# Patient Record
Sex: MALE | Race: WHITE | Employment: OTHER | URBAN - METROPOLITAN AREA
[De-identification: names, ages, dates, MRNs, and addresses within clinical notes are randomized per-mention and may not be internally consistent; named-entity substitution may affect disease eponyms.]

---

## 2018-03-28 ENCOUNTER — HOSPITAL ENCOUNTER (OUTPATIENT)
Dept: ULTRASOUND IMAGING | Age: 73
Discharge: HOME | End: 2018-03-28
Attending: NURSE PRACTITIONER | Admitting: NURSE PRACTITIONER

## 2018-03-28 DIAGNOSIS — Z13.6 ENCOUNTER FOR ABDOMINAL AORTIC ANEURYSM SCREENING: ICD-10-CM

## 2018-03-28 DIAGNOSIS — Z13.6 ENCOUNTER FOR SCREENING FOR CARDIOVASCULAR DISORDERS: ICD-10-CM

## 2018-04-03 ENCOUNTER — HOSPITAL ENCOUNTER (OUTPATIENT)
Dept: OTHER | Age: 73
Discharge: HOME | End: 2018-04-03
Attending: INTERNAL MEDICINE | Admitting: INTERNAL MEDICINE

## 2018-04-04 LAB
HAV IGM SER IA-ACNC: NORMAL
HEPATITIS B CORE IGM ANTIBODY: NORMAL
HEPATITIS B SURFACE ANTIGEN INTERPRETATION: NORMAL
HEPATITIS C ANTIBODY INTERPRETATION: NORMAL

## 2018-04-06 LAB — AFP: 4.9 UG/L

## 2018-05-17 ENCOUNTER — HOSPITAL ENCOUNTER (OUTPATIENT)
Dept: CT IMAGING | Facility: MEDICAL CENTER | Age: 73
Discharge: HOME | End: 2018-05-17
Attending: INTERNAL MEDICINE | Admitting: INTERNAL MEDICINE

## 2018-05-17 DIAGNOSIS — C18.7 MALIGNANT NEOPLASM OF SIGMOID COLON (HCC): ICD-10-CM

## 2018-05-17 DIAGNOSIS — R52 PAIN: ICD-10-CM

## 2018-05-17 LAB
BUN BLDV-MCNC: 7 MG/DL (ref 7–20)
CREAT SERPL-MCNC: 0.9 MG/DL (ref 0.8–1.3)
GFR AFRICAN AMERICAN: >60
GFR NON-AFRICAN AMERICAN: >60

## 2018-05-29 ENCOUNTER — INITIAL CONSULT (OUTPATIENT)
Dept: SURGERY | Age: 73
End: 2018-05-29

## 2018-05-29 VITALS
HEIGHT: 69 IN | BODY MASS INDEX: 29.47 KG/M2 | SYSTOLIC BLOOD PRESSURE: 134 MMHG | DIASTOLIC BLOOD PRESSURE: 82 MMHG | WEIGHT: 199 LBS

## 2018-05-29 DIAGNOSIS — R16.0 LIVER MASSES: ICD-10-CM

## 2018-05-29 DIAGNOSIS — K57.30 DIVERTICULOSIS OF LARGE INTESTINE WITHOUT HEMORRHAGE: ICD-10-CM

## 2018-05-29 DIAGNOSIS — K63.89 COLONIC MASS: Primary | ICD-10-CM

## 2018-05-29 PROCEDURE — 4004F PT TOBACCO SCREEN RCVD TLK: CPT | Performed by: SURGERY

## 2018-05-29 PROCEDURE — 99203 OFFICE O/P NEW LOW 30 MIN: CPT | Performed by: SURGERY

## 2018-05-29 PROCEDURE — 1123F ACP DISCUSS/DSCN MKR DOCD: CPT | Performed by: SURGERY

## 2018-05-29 PROCEDURE — 4040F PNEUMOC VAC/ADMIN/RCVD: CPT | Performed by: SURGERY

## 2018-05-29 PROCEDURE — 3017F COLORECTAL CA SCREEN DOC REV: CPT | Performed by: SURGERY

## 2018-05-29 PROCEDURE — G8427 DOCREV CUR MEDS BY ELIG CLIN: HCPCS | Performed by: SURGERY

## 2018-05-29 PROCEDURE — G8419 CALC BMI OUT NRM PARAM NOF/U: HCPCS | Performed by: SURGERY

## 2018-05-29 RX ORDER — AMLODIPINE BESYLATE 5 MG/1
5 TABLET ORAL DAILY
COMMUNITY
End: 2020-05-29 | Stop reason: SDUPTHER

## 2018-05-29 RX ORDER — GLIPIZIDE 5 MG/1
5 TABLET ORAL DAILY
COMMUNITY
End: 2020-05-29 | Stop reason: SDUPTHER

## 2018-05-29 RX ORDER — NEBIVOLOL 10 MG/1
10 TABLET ORAL DAILY
COMMUNITY
End: 2020-05-29 | Stop reason: SDUPTHER

## 2018-06-04 ENCOUNTER — HOSPITAL ENCOUNTER (OUTPATIENT)
Dept: MRI IMAGING | Age: 73
Discharge: HOME | End: 2018-06-04
Attending: SURGERY | Admitting: SURGERY

## 2018-06-04 DIAGNOSIS — R16.0 LIVER MASSES: ICD-10-CM

## 2018-06-04 DIAGNOSIS — K63.89 COLONIC MASS: ICD-10-CM

## 2018-06-04 DIAGNOSIS — K63.9 DISEASE OF INTESTINE: ICD-10-CM

## 2018-06-05 ENCOUNTER — TELEPHONE (OUTPATIENT)
Dept: SURGERY | Age: 73
End: 2018-06-05

## 2018-06-06 ENCOUNTER — TELEPHONE (OUTPATIENT)
Dept: SURGERY | Age: 73
End: 2018-06-06

## 2018-06-07 ENCOUNTER — SURG/PROC ORDERS (OUTPATIENT)
Dept: SURGERY | Age: 73
End: 2018-06-07

## 2018-06-07 RX ORDER — HEPARIN SODIUM 5000 [USP'U]/ML
5000 INJECTION, SOLUTION INTRAVENOUS; SUBCUTANEOUS EVERY 8 HOURS SCHEDULED
Status: CANCELLED | OUTPATIENT
Start: 2018-06-07

## 2018-06-07 RX ORDER — SODIUM CHLORIDE 0.9 % (FLUSH) 0.9 %
10 SYRINGE (ML) INJECTION EVERY 12 HOURS SCHEDULED
Status: CANCELLED | OUTPATIENT
Start: 2018-06-07

## 2018-06-07 RX ORDER — SODIUM CHLORIDE 0.9 % (FLUSH) 0.9 %
10 SYRINGE (ML) INJECTION PRN
Status: CANCELLED | OUTPATIENT
Start: 2018-06-07

## 2018-06-07 RX ORDER — ALVIMOPAN 12 MG/1
12 CAPSULE ORAL 2 TIMES DAILY
Status: CANCELLED | OUTPATIENT
Start: 2018-06-07 | End: 2018-06-12

## 2018-06-14 PROBLEM — D12.5 ADENOMATOUS POLYP OF SIGMOID COLON: Status: ACTIVE | Noted: 2018-06-14

## 2018-06-18 PROBLEM — D12.5 ADENOMATOUS POLYP OF SIGMOID COLON: Status: RESOLVED | Noted: 2018-06-14 | Resolved: 2018-06-18

## 2018-06-21 ENCOUNTER — TELEPHONE (OUTPATIENT)
Dept: SURGERY | Age: 73
End: 2018-06-21

## 2018-06-25 ENCOUNTER — TELEPHONE (OUTPATIENT)
Dept: SURGERY | Age: 73
End: 2018-06-25

## 2018-06-26 ENCOUNTER — OFFICE VISIT (OUTPATIENT)
Dept: SURGERY | Age: 73
End: 2018-06-26

## 2018-06-26 VITALS
HEIGHT: 69 IN | WEIGHT: 188 LBS | SYSTOLIC BLOOD PRESSURE: 124 MMHG | BODY MASS INDEX: 27.85 KG/M2 | DIASTOLIC BLOOD PRESSURE: 70 MMHG

## 2018-06-26 DIAGNOSIS — Z98.890 POST-OPERATIVE STATE: Primary | ICD-10-CM

## 2018-06-26 PROCEDURE — 99024 POSTOP FOLLOW-UP VISIT: CPT | Performed by: SURGERY

## 2018-07-10 ENCOUNTER — OFFICE VISIT (OUTPATIENT)
Dept: SURGERY | Age: 73
End: 2018-07-10

## 2018-07-10 VITALS
DIASTOLIC BLOOD PRESSURE: 70 MMHG | HEIGHT: 69 IN | SYSTOLIC BLOOD PRESSURE: 138 MMHG | WEIGHT: 186 LBS | BODY MASS INDEX: 27.55 KG/M2

## 2018-07-10 DIAGNOSIS — Z98.890 POST-OPERATIVE STATE: Primary | ICD-10-CM

## 2018-07-10 PROCEDURE — 99024 POSTOP FOLLOW-UP VISIT: CPT | Performed by: SURGERY

## 2018-07-24 ENCOUNTER — OFFICE VISIT (OUTPATIENT)
Dept: SURGERY | Age: 73
End: 2018-07-24

## 2018-07-24 VITALS
WEIGHT: 186 LBS | SYSTOLIC BLOOD PRESSURE: 134 MMHG | HEIGHT: 69 IN | BODY MASS INDEX: 27.55 KG/M2 | DIASTOLIC BLOOD PRESSURE: 82 MMHG

## 2018-07-24 DIAGNOSIS — Z98.890 POST-OPERATIVE STATE: Primary | ICD-10-CM

## 2018-07-24 PROCEDURE — 99024 POSTOP FOLLOW-UP VISIT: CPT | Performed by: SURGERY

## 2019-09-23 ENCOUNTER — TELEPHONE (OUTPATIENT)
Dept: FAMILY MEDICINE CLINIC | Age: 74
End: 2019-09-23

## 2019-09-23 ENCOUNTER — OFFICE VISIT (OUTPATIENT)
Dept: FAMILY MEDICINE CLINIC | Age: 74
End: 2019-09-23
Payer: MEDICARE

## 2019-09-23 VITALS
HEART RATE: 73 BPM | DIASTOLIC BLOOD PRESSURE: 72 MMHG | BODY MASS INDEX: 31.41 KG/M2 | SYSTOLIC BLOOD PRESSURE: 160 MMHG | TEMPERATURE: 98.1 F | OXYGEN SATURATION: 97 % | WEIGHT: 207.25 LBS | HEIGHT: 68 IN

## 2019-09-23 DIAGNOSIS — E11.9 TYPE 2 DIABETES MELLITUS WITHOUT COMPLICATION, WITHOUT LONG-TERM CURRENT USE OF INSULIN (HCC): Primary | ICD-10-CM

## 2019-09-23 DIAGNOSIS — I63.9 CEREBROVASCULAR ACCIDENT (CVA), UNSPECIFIED MECHANISM (HCC): ICD-10-CM

## 2019-09-23 DIAGNOSIS — E78.2 MIXED HYPERLIPIDEMIA: ICD-10-CM

## 2019-09-23 DIAGNOSIS — K21.9 GASTROESOPHAGEAL REFLUX DISEASE WITHOUT ESOPHAGITIS: ICD-10-CM

## 2019-09-23 DIAGNOSIS — I10 ESSENTIAL HYPERTENSION: ICD-10-CM

## 2019-09-23 LAB
A/G RATIO: 1.5 (ref 1.1–2.2)
ALBUMIN SERPL-MCNC: 4.7 G/DL (ref 3.4–5)
ALP BLD-CCNC: 51 U/L (ref 40–129)
ALT SERPL-CCNC: 28 U/L (ref 10–40)
ANION GAP SERPL CALCULATED.3IONS-SCNC: 15 MMOL/L (ref 3–16)
AST SERPL-CCNC: 34 U/L (ref 15–37)
BASOPHILS ABSOLUTE: 0 K/UL (ref 0–0.2)
BASOPHILS RELATIVE PERCENT: 1 %
BILIRUB SERPL-MCNC: 0.5 MG/DL (ref 0–1)
BUN BLDV-MCNC: 12 MG/DL (ref 7–20)
CALCIUM SERPL-MCNC: 10 MG/DL (ref 8.3–10.6)
CHLORIDE BLD-SCNC: 98 MMOL/L (ref 99–110)
CHOLESTEROL, TOTAL: 144 MG/DL (ref 0–199)
CO2: 26 MMOL/L (ref 21–32)
CREAT SERPL-MCNC: 1.1 MG/DL (ref 0.8–1.3)
EOSINOPHILS ABSOLUTE: 0.1 K/UL (ref 0–0.6)
EOSINOPHILS RELATIVE PERCENT: 1.9 %
GFR AFRICAN AMERICAN: >60
GFR NON-AFRICAN AMERICAN: >60
GLOBULIN: 3.1 G/DL
GLUCOSE BLD-MCNC: 149 MG/DL (ref 70–99)
HCT VFR BLD CALC: 38 % (ref 40.5–52.5)
HDLC SERPL-MCNC: 35 MG/DL (ref 40–60)
HEMOGLOBIN: 12 G/DL (ref 13.5–17.5)
LDL CHOLESTEROL CALCULATED: 78 MG/DL
LYMPHOCYTES ABSOLUTE: 1 K/UL (ref 1–5.1)
LYMPHOCYTES RELATIVE PERCENT: 21.9 %
MCH RBC QN AUTO: 25 PG (ref 26–34)
MCHC RBC AUTO-ENTMCNC: 31.5 G/DL (ref 31–36)
MCV RBC AUTO: 79.5 FL (ref 80–100)
MONOCYTES ABSOLUTE: 0.4 K/UL (ref 0–1.3)
MONOCYTES RELATIVE PERCENT: 9.1 %
NEUTROPHILS ABSOLUTE: 3 K/UL (ref 1.7–7.7)
NEUTROPHILS RELATIVE PERCENT: 66.1 %
PDW BLD-RTO: 16.6 % (ref 12.4–15.4)
PLATELET # BLD: 197 K/UL (ref 135–450)
PMV BLD AUTO: 9.1 FL (ref 5–10.5)
POTASSIUM SERPL-SCNC: 4.6 MMOL/L (ref 3.5–5.1)
RBC # BLD: 4.79 M/UL (ref 4.2–5.9)
SODIUM BLD-SCNC: 139 MMOL/L (ref 136–145)
TOTAL PROTEIN: 7.8 G/DL (ref 6.4–8.2)
TRIGL SERPL-MCNC: 157 MG/DL (ref 0–150)
VLDLC SERPL CALC-MCNC: 31 MG/DL
WBC # BLD: 4.6 K/UL (ref 4–11)

## 2019-09-23 PROCEDURE — 4004F PT TOBACCO SCREEN RCVD TLK: CPT | Performed by: NURSE PRACTITIONER

## 2019-09-23 PROCEDURE — 4040F PNEUMOC VAC/ADMIN/RCVD: CPT | Performed by: NURSE PRACTITIONER

## 2019-09-23 PROCEDURE — 2022F DILAT RTA XM EVC RTNOPTHY: CPT | Performed by: NURSE PRACTITIONER

## 2019-09-23 PROCEDURE — 36415 COLL VENOUS BLD VENIPUNCTURE: CPT | Performed by: NURSE PRACTITIONER

## 2019-09-23 PROCEDURE — 3017F COLORECTAL CA SCREEN DOC REV: CPT | Performed by: NURSE PRACTITIONER

## 2019-09-23 PROCEDURE — G8598 ASA/ANTIPLAT THER USED: HCPCS | Performed by: NURSE PRACTITIONER

## 2019-09-23 PROCEDURE — 1123F ACP DISCUSS/DSCN MKR DOCD: CPT | Performed by: NURSE PRACTITIONER

## 2019-09-23 PROCEDURE — G8417 CALC BMI ABV UP PARAM F/U: HCPCS | Performed by: NURSE PRACTITIONER

## 2019-09-23 PROCEDURE — 99203 OFFICE O/P NEW LOW 30 MIN: CPT | Performed by: NURSE PRACTITIONER

## 2019-09-23 PROCEDURE — G8427 DOCREV CUR MEDS BY ELIG CLIN: HCPCS | Performed by: NURSE PRACTITIONER

## 2019-09-23 PROCEDURE — 3046F HEMOGLOBIN A1C LEVEL >9.0%: CPT | Performed by: NURSE PRACTITIONER

## 2019-09-23 RX ORDER — ATORVASTATIN CALCIUM 10 MG/1
10 TABLET, FILM COATED ORAL DAILY
COMMUNITY
End: 2020-05-29 | Stop reason: SDUPTHER

## 2019-09-23 RX ORDER — ERGOCALCIFEROL 1.25 MG/1
CAPSULE ORAL
COMMUNITY
Start: 2018-04-06 | End: 2020-05-29 | Stop reason: SDUPTHER

## 2019-09-23 RX ORDER — LISINOPRIL 30 MG/1
30 TABLET ORAL DAILY
COMMUNITY
End: 2020-05-29 | Stop reason: SDUPTHER

## 2019-09-23 ASSESSMENT — ENCOUNTER SYMPTOMS
SORE THROAT: 0
COUGH: 0
RHINORRHEA: 0
CONSTIPATION: 1
BACK PAIN: 1
WHEEZING: 0
SHORTNESS OF BREATH: 0
TROUBLE SWALLOWING: 0
ABDOMINAL PAIN: 0
DIARRHEA: 0
BLOOD IN STOOL: 0
ABDOMINAL DISTENTION: 1
NAUSEA: 1

## 2019-09-23 ASSESSMENT — PATIENT HEALTH QUESTIONNAIRE - PHQ9
2. FEELING DOWN, DEPRESSED OR HOPELESS: 1
SUM OF ALL RESPONSES TO PHQ QUESTIONS 1-9: 2
SUM OF ALL RESPONSES TO PHQ QUESTIONS 1-9: 2
1. LITTLE INTEREST OR PLEASURE IN DOING THINGS: 1
SUM OF ALL RESPONSES TO PHQ9 QUESTIONS 1 & 2: 2

## 2019-09-23 NOTE — PATIENT INSTRUCTIONS
Please read the healthy family handout that you were given and share it with your family. Please compare this printed medication list with your medications at home to be sure they are the same. If you have any medications that are different please contact us immediately at [phone]. Also review your allergies that we have listed, these may also include medications that you have not been able to tolerate, make sure everything listed is correct. If you have any allergies that are different please contact us immediately at [phone]. Patient Education        Learning About Meal Planning for Diabetes  Why plan your meals? Meal planning can be a key part of managing diabetes. Planning meals and snacks with the right balance of carbohydrate, protein, and fat can help you keep your blood sugar at the target level you set with your doctor. You don't have to eat special foods. You can eat what your family eats, including sweets once in a while. But you do have to pay attention to how often you eat and how much you eat of certain foods. You may want to work with a dietitian or a certified diabetes educator. He or she can give you tips and meal ideas and can answer your questions about meal planning. This health professional can also help you reach a healthy weight if that is one of your goals. What plan is right for you? Your dietitian or diabetes educator may suggest that you start with the plate format or carbohydrate counting. The plate format  The plate format is a simple way to help you manage how you eat. You plan meals by learning how much space each food should take on a plate. Using the plate format helps you spread carbohydrate throughout the day. It can make it easier to keep your blood sugar level within your target range. It also helps you see if you're eating healthy portion sizes. To use the plate format, you put non-starchy vegetables on half your plate.  Add meat or meat substitutes on one-quarter of the plate. Put a grain or starchy vegetable (such as brown rice or a potato) on the final quarter of the plate. You can add a small piece of fruit and some low-fat or fat-free milk or yogurt, depending on your carbohydrate goal for each meal.  Here are some tips for using the plate format:  · Make sure that you are not using an oversized plate. A 9-inch plate is best. Many restaurants use larger plates. · Get used to using the plate format at home. Then you can use it when you eat out. · Write down your questions about using the plate format. Talk to your doctor, a dietitian, or a diabetes educator about your concerns. Carbohydrate counting  With carbohydrate counting, you plan meals based on the amount of carbohydrate in each food. Carbohydrate raises blood sugar higher and more quickly than any other nutrient. It is found in desserts, breads and cereals, and fruit. It's also found in starchy vegetables such as potatoes and corn, grains such as rice and pasta, and milk and yogurt. Spreading carbohydrate throughout the day helps keep your blood sugar levels within your target range. Your daily amount depends on several things, including your weight, how active you are, which diabetes medicines you take, and what your goals are for your blood sugar levels. A registered dietitian or diabetes educator can help you plan how much carbohydrate to include in each meal and snack. A guideline for your daily amount of carbohydrate is:  · 45 to 60 grams at each meal. That's about the same as 3 to 4 carbohydrate servings. · 15 to 20 grams at each snack. That's about the same as 1 carbohydrate serving. The Nutrition Facts label on packaged foods tells you how much carbohydrate is in a serving of the food. First, look at the serving size on the food label. Is that the amount you eat in a serving? All of the nutrition information on a food label is based on that serving size.  So if you eat more or less than that, you'll need to adjust the other numbers. Total carbohydrate is the next thing you need to look for on the label. If you count carbohydrate servings, one serving of carbohydrate is 15 grams. For foods that don't come with labels, such as fresh fruits and vegetables, you'll need a guide that lists carbohydrate in these foods. Ask your doctor, dietitian, or diabetes educator about books or other nutrition guides you can use. If you take insulin, you need to know how many grams of carbohydrate are in a meal. This lets you know how much rapid-acting insulin to take before you eat. If you use an insulin pump, you get a constant rate of insulin during the day. So the pump must be programmed at meals to give you extra insulin to cover the rise in blood sugar after meals. When you know how much carbohydrate you will eat, you can take the right amount of insulin. Or, if you always use the same amount of insulin, you need to make sure that you eat the same amount of carbohydrate at meals. If you need more help to understand carbohydrate counting and food labels, ask your doctor, dietitian, or diabetes educator. How do you get started with meal planning? Here are some tips to get started:  · Plan your meals a week at a time. Don't forget to include snacks too. · Use cookbooks or online recipes to plan several main meals. Plan some quick meals for busy nights. You also can double some recipes that freeze well. Then you can save half for other busy nights when you don't have time to cook. · Make sure you have the ingredients you need for your recipes. If you're running low on basic items, put these items on your shopping list too. · List foods that you use to make breakfasts, lunches, and snacks. List plenty of fruits and vegetables. · Post this list on the refrigerator. Add to it as you think of more things you need. · Take the list to the store to do your weekly shopping.   Follow-up care is a key part of your

## 2019-09-24 DIAGNOSIS — D64.9 ANEMIA, UNSPECIFIED TYPE: ICD-10-CM

## 2019-09-24 DIAGNOSIS — D64.9 ANEMIA, UNSPECIFIED TYPE: Primary | ICD-10-CM

## 2019-09-24 LAB
ESTIMATED AVERAGE GLUCOSE: 154.2 MG/DL
FERRITIN: 23.4 NG/ML (ref 30–400)
FOLATE: 7.9 NG/ML (ref 4.78–24.2)
HBA1C MFR BLD: 7 %
IRON SATURATION: 7 % (ref 20–50)
IRON: 37 UG/DL (ref 59–158)
TOTAL IRON BINDING CAPACITY: 552 UG/DL (ref 260–445)
VITAMIN B-12: 321 PG/ML (ref 211–911)

## 2020-01-23 ENCOUNTER — OFFICE VISIT (OUTPATIENT)
Dept: FAMILY MEDICINE CLINIC | Age: 75
End: 2020-01-23
Payer: MEDICARE

## 2020-01-23 VITALS
OXYGEN SATURATION: 96 % | HEART RATE: 73 BPM | BODY MASS INDEX: 31.4 KG/M2 | WEIGHT: 206.5 LBS | SYSTOLIC BLOOD PRESSURE: 138 MMHG | DIASTOLIC BLOOD PRESSURE: 88 MMHG | TEMPERATURE: 97.9 F

## 2020-01-23 PROBLEM — Z53.20 COLONOSCOPY REFUSED: Status: ACTIVE | Noted: 2020-01-23

## 2020-01-23 PROBLEM — D50.8 IRON DEFICIENCY ANEMIA SECONDARY TO INADEQUATE DIETARY IRON INTAKE: Status: ACTIVE | Noted: 2020-01-23

## 2020-01-23 LAB
A/G RATIO: 1.8 (ref 1.1–2.2)
ALBUMIN SERPL-MCNC: 4.8 G/DL (ref 3.4–5)
ALP BLD-CCNC: 62 U/L (ref 40–129)
ALT SERPL-CCNC: 35 U/L (ref 10–40)
ANION GAP SERPL CALCULATED.3IONS-SCNC: 17 MMOL/L (ref 3–16)
AST SERPL-CCNC: 37 U/L (ref 15–37)
BASOPHILS ABSOLUTE: 0.1 K/UL (ref 0–0.2)
BASOPHILS RELATIVE PERCENT: 1 %
BILIRUB SERPL-MCNC: 0.7 MG/DL (ref 0–1)
BUN BLDV-MCNC: 17 MG/DL (ref 7–20)
CALCIUM SERPL-MCNC: 10.3 MG/DL (ref 8.3–10.6)
CHLORIDE BLD-SCNC: 93 MMOL/L (ref 99–110)
CO2: 25 MMOL/L (ref 21–32)
CREAT SERPL-MCNC: 1 MG/DL (ref 0.8–1.3)
CREATININE URINE POCT: 50
EOSINOPHILS ABSOLUTE: 0.1 K/UL (ref 0–0.6)
EOSINOPHILS RELATIVE PERCENT: 1.6 %
GFR AFRICAN AMERICAN: >60
GFR NON-AFRICAN AMERICAN: >60
GLOBULIN: 2.6 G/DL
GLUCOSE BLD-MCNC: 179 MG/DL (ref 70–99)
HCT VFR BLD CALC: 44.1 % (ref 40.5–52.5)
HEMOGLOBIN: 15.1 G/DL (ref 13.5–17.5)
IRON SATURATION: 32 % (ref 20–50)
IRON: 129 UG/DL (ref 59–158)
LYMPHOCYTES ABSOLUTE: 1.5 K/UL (ref 1–5.1)
LYMPHOCYTES RELATIVE PERCENT: 21.8 %
MCH RBC QN AUTO: 33.4 PG (ref 26–34)
MCHC RBC AUTO-ENTMCNC: 34.3 G/DL (ref 31–36)
MCV RBC AUTO: 97.2 FL (ref 80–100)
MICROALBUMIN/CREAT 24H UR: 10 MG/G{CREAT}
MICROALBUMIN/CREAT UR-RTO: <30
MONOCYTES ABSOLUTE: 0.6 K/UL (ref 0–1.3)
MONOCYTES RELATIVE PERCENT: 8.5 %
NEUTROPHILS ABSOLUTE: 4.7 K/UL (ref 1.7–7.7)
NEUTROPHILS RELATIVE PERCENT: 67.1 %
PDW BLD-RTO: 13.5 % (ref 12.4–15.4)
PLATELET # BLD: 213 K/UL (ref 135–450)
PMV BLD AUTO: 9 FL (ref 5–10.5)
POTASSIUM SERPL-SCNC: 4.3 MMOL/L (ref 3.5–5.1)
RBC # BLD: 4.53 M/UL (ref 4.2–5.9)
SODIUM BLD-SCNC: 135 MMOL/L (ref 136–145)
TOTAL IRON BINDING CAPACITY: 407 UG/DL (ref 260–445)
TOTAL PROTEIN: 7.4 G/DL (ref 6.4–8.2)
VITAMIN D 25-HYDROXY: 57.5 NG/ML
WBC # BLD: 7 K/UL (ref 4–11)

## 2020-01-23 PROCEDURE — 82044 UR ALBUMIN SEMIQUANTITATIVE: CPT | Performed by: NURSE PRACTITIONER

## 2020-01-23 PROCEDURE — 4040F PNEUMOC VAC/ADMIN/RCVD: CPT | Performed by: NURSE PRACTITIONER

## 2020-01-23 PROCEDURE — 99214 OFFICE O/P EST MOD 30 MIN: CPT | Performed by: NURSE PRACTITIONER

## 2020-01-23 PROCEDURE — 36415 COLL VENOUS BLD VENIPUNCTURE: CPT | Performed by: NURSE PRACTITIONER

## 2020-01-23 PROCEDURE — 3017F COLORECTAL CA SCREEN DOC REV: CPT | Performed by: NURSE PRACTITIONER

## 2020-01-23 PROCEDURE — 1123F ACP DISCUSS/DSCN MKR DOCD: CPT | Performed by: NURSE PRACTITIONER

## 2020-01-23 PROCEDURE — 4004F PT TOBACCO SCREEN RCVD TLK: CPT | Performed by: NURSE PRACTITIONER

## 2020-01-23 PROCEDURE — 3046F HEMOGLOBIN A1C LEVEL >9.0%: CPT | Performed by: NURSE PRACTITIONER

## 2020-01-23 PROCEDURE — G8484 FLU IMMUNIZE NO ADMIN: HCPCS | Performed by: NURSE PRACTITIONER

## 2020-01-23 PROCEDURE — G8417 CALC BMI ABV UP PARAM F/U: HCPCS | Performed by: NURSE PRACTITIONER

## 2020-01-23 PROCEDURE — 2022F DILAT RTA XM EVC RTNOPTHY: CPT | Performed by: NURSE PRACTITIONER

## 2020-01-23 PROCEDURE — G8427 DOCREV CUR MEDS BY ELIG CLIN: HCPCS | Performed by: NURSE PRACTITIONER

## 2020-01-23 ASSESSMENT — PATIENT HEALTH QUESTIONNAIRE - PHQ9
1. LITTLE INTEREST OR PLEASURE IN DOING THINGS: 1
2. FEELING DOWN, DEPRESSED OR HOPELESS: 1
SUM OF ALL RESPONSES TO PHQ QUESTIONS 1-9: 2
SUM OF ALL RESPONSES TO PHQ QUESTIONS 1-9: 2
SUM OF ALL RESPONSES TO PHQ9 QUESTIONS 1 & 2: 2

## 2020-01-23 NOTE — PROGRESS NOTES
his iron level was low and that he was anemic. He has been taking over-the-counter iron supplement. He reports dark stools with iron. He states he has not had any worsening constipation after starting iron supplement. He reports after he had part of his colon removed he has chronic hard stools. He does not eat very much fiber. He also takes a vitamin D. He does not take any magnesium. He denies rectal bleeding, lightheadedness, abnormal fatigue, hematuria. Reports he does chew on ice at times. He last had colonoscopy in 2008 and says he will never have one done again. Refuses fit test.    He has had a head cold for a couple of weeks. Symptoms have been intermittent. He states that today his voice has been a bit hoarse. Reports postnasal drip and occasional ear popping. Denies fever, fatigue, malaise, sore throat, ear pain, cough. He states he does not feel sick. No at home treatment. Current Outpatient Medications   Medication Sig Dispense Refill    atorvastatin (LIPITOR) 10 MG tablet Take 10 mg by mouth daily      vitamin D (ERGOCALCIFEROL) 39138 units CAPS capsule TAKE 1 CAPSULE ONCE A WEEK      lisinopril (PRINIVIL;ZESTRIL) 30 MG tablet Take 30 mg by mouth daily      nebivolol (BYSTOLIC) 10 MG tablet Take 10 mg by mouth daily      linagliptin (TRADJENTA) 5 MG tablet Take 5 mg by mouth daily      amLODIPine (NORVASC) 5 MG tablet Take 5 mg by mouth daily      glipiZIDE (GLUCOTROL) 5 MG tablet Take 5 mg by mouth daily       clopidogrel (PLAVIX) 75 MG tablet Take 75 mg by mouth daily      fenofibrate (TRICOR) 145 MG tablet Take 145 mg by mouth daily.  esomeprazole Magnesium (NEXIUM) 40 MG PACK Take 40 mg by mouth daily. No current facility-administered medications for this visit. Patient's past medical history, surgical history, family history, medications,  and allergies  were all reviewed and updated as appropriate today.       Review of Systems  See HPI     Physical Exam  Vitals signs and nursing note reviewed. Constitutional:       General: He is not in acute distress. Appearance: He is well-developed. He is not toxic-appearing. HENT:      Head: Normocephalic and atraumatic. Right Ear: Tympanic membrane normal.      Left Ear: Tympanic membrane normal.      Nose: Nose normal.      Mouth/Throat:      Mouth: Mucous membranes are moist.      Pharynx: No posterior oropharyngeal erythema. Eyes:      Extraocular Movements: Extraocular movements intact. Conjunctiva/sclera: Conjunctivae normal.   Neck:      Musculoskeletal: Neck supple. Vascular: No carotid bruit. Cardiovascular:      Rate and Rhythm: Normal rate and regular rhythm. Pulses: Normal pulses. Heart sounds: Normal heart sounds. No murmur. Pulmonary:      Effort: Pulmonary effort is normal.      Breath sounds: Normal breath sounds. No wheezing or rhonchi. Chest:      Chest wall: No tenderness. Abdominal:      General: Bowel sounds are normal. There is no distension. Palpations: Abdomen is soft. Musculoskeletal:      Right lower leg: No edema. Left lower leg: No edema. Comments: Foot exam:     Cyanosis or signs of ischemia - none      Ulcerations - none    Callouses - mild bilateral    Deformities - none                         Monofilament sensation testing - normal                         Pulses - present    Lymphadenopathy:      Cervical: No cervical adenopathy. Skin:     General: Skin is warm and dry. Capillary Refill: Capillary refill takes 2 to 3 seconds. Neurological:      Mental Status: He is alert and oriented to person, place, and time. Psychiatric:         Behavior: Behavior normal.         Thought Content:  Thought content normal.         Vitals:    01/23/20 1412 01/23/20 1418   BP: (!) 148/78 138/88   Pulse: 73    Temp: 97.9 °F (36.6 °C)    TempSrc: Oral    SpO2: 96%    Weight: 206 lb 8 oz (93.7 kg)            Xavier Brny, APRN-CNP    The note

## 2020-01-23 NOTE — PATIENT INSTRUCTIONS
Please read the healthy family handout that you were given and share it with your family. Please compare this printed medication list with your medications at home to be sure they are the same. If you have any medications that are different please contact us immediately at [phone]. Also review your allergies that we have listed, these may also include medications that you have not been able to tolerate, make sure everything listed is correct. If you have any allergies that are different please contact us immediately at [phone]. Patient Education        Stopping Smoking: Care Instructions  Your Care Instructions  Cigarette smokers crave the nicotine in cigarettes. Giving it up is much harder than simply changing a habit. Your body has to stop craving the nicotine. It is hard to quit, but you can do it. There are many tools that people use to quit smoking. You may find that combining tools works best for you. There are several steps to quitting. First you get ready to quit. Then you get support to help you. After that, you learn new skills and behaviors to become a nonsmoker. For many people, a necessary step is getting and using medicine. Your doctor will help you set up the plan that best meets your needs. You may want to attend a smoking cessation program to help you quit smoking. When you choose a program, look for one that has proven success. Ask your doctor for ideas. You will greatly increase your chances of success if you take medicine as well as get counseling or join a cessation program.  Some of the changes you feel when you first quit tobacco are uncomfortable. Your body will miss the nicotine at first, and you may feel short-tempered and grumpy. You may have trouble sleeping or concentrating. Medicine can help you deal with these symptoms. You may struggle with changing your smoking habits and rituals. The last step is the tricky one:  Be prepared for the smoking urge to continue for a time. This is a lot to deal with, but keep at it. You will feel better. Follow-up care is a key part of your treatment and safety. Be sure to make and go to all appointments, and call your doctor if you are having problems. It's also a good idea to know your test results and keep a list of the medicines you take. How can you care for yourself at home? · Ask your family, friends, and coworkers for support. You have a better chance of quitting if you have help and support. · Join a support group, such as Nicotine Anonymous, for people who are trying to quit smoking. · Consider signing up for a smoking cessation program, such as the American Lung Association's Freedom from Smoking program.  · Get text messaging support. Go to the website at www.smokefree. gov to sign up for the [facility] program.  · Set a quit date. Pick your date carefully so that it is not right in the middle of a big deadline or stressful time. Once you quit, do not even take a puff. Get rid of all ashtrays and lighters after your last cigarette. Clean your house and your clothes so that they do not smell of smoke. · Learn how to be a nonsmoker. Think about ways you can avoid those things that make you reach for a cigarette. ? Avoid situations that put you at greatest risk for smoking. For some people, it is hard to have a drink with friends without smoking. For others, they might skip a coffee break with coworkers who smoke. ? Change your daily routine. Take a different route to work or eat a meal in a different place. · Cut down on stress. Calm yourself or release tension by doing an activity you enjoy, such as reading a book, taking a hot bath, or gardening. · Talk to your doctor or pharmacist about nicotine replacement therapy, which replaces the nicotine in your body. You still get nicotine but you do not use tobacco. Nicotine replacement products help you slowly reduce the amount of nicotine you need.  These products come in several

## 2020-01-24 LAB
ESTIMATED AVERAGE GLUCOSE: 145.6 MG/DL
HBA1C MFR BLD: 6.7 %

## 2020-05-29 RX ORDER — NEBIVOLOL 10 MG/1
10 TABLET ORAL DAILY
Qty: 30 TABLET | Refills: 2 | Status: SHIPPED | OUTPATIENT
Start: 2020-05-29 | End: 2020-07-29

## 2020-05-29 RX ORDER — GLIPIZIDE 5 MG/1
5 TABLET ORAL DAILY
Qty: 30 TABLET | Refills: 2 | Status: SHIPPED | OUTPATIENT
Start: 2020-05-29 | End: 2020-07-29

## 2020-05-29 RX ORDER — ERGOCALCIFEROL 1.25 MG/1
CAPSULE ORAL
Qty: 5 CAPSULE | Refills: 2 | Status: ON HOLD | OUTPATIENT
Start: 2020-05-29 | End: 2021-10-13 | Stop reason: HOSPADM

## 2020-05-29 RX ORDER — CLOPIDOGREL BISULFATE 75 MG/1
75 TABLET ORAL DAILY
Qty: 30 TABLET | Refills: 2 | Status: SHIPPED | OUTPATIENT
Start: 2020-05-29 | End: 2020-07-29

## 2020-05-29 RX ORDER — AMLODIPINE BESYLATE 5 MG/1
5 TABLET ORAL DAILY
Qty: 30 TABLET | Refills: 2 | Status: SHIPPED | OUTPATIENT
Start: 2020-05-29 | End: 2020-07-29

## 2020-05-29 RX ORDER — FENOFIBRATE 145 MG/1
145 TABLET, COATED ORAL DAILY
Qty: 30 TABLET | Refills: 2 | Status: SHIPPED | OUTPATIENT
Start: 2020-05-29 | End: 2020-07-29

## 2020-05-29 RX ORDER — ESOMEPRAZOLE MAGNESIUM 40 MG/1
40 FOR SUSPENSION ORAL DAILY
Qty: 30 PACKET | Refills: 2 | Status: ON HOLD | OUTPATIENT
Start: 2020-05-29 | End: 2021-10-13 | Stop reason: HOSPADM

## 2020-05-29 RX ORDER — ATORVASTATIN CALCIUM 10 MG/1
10 TABLET, FILM COATED ORAL DAILY
Qty: 30 TABLET | Refills: 2 | Status: SHIPPED | OUTPATIENT
Start: 2020-05-29 | End: 2020-07-29

## 2020-05-29 RX ORDER — LISINOPRIL 30 MG/1
30 TABLET ORAL DAILY
Qty: 30 TABLET | Refills: 1 | Status: SHIPPED | OUTPATIENT
Start: 2020-05-29 | End: 2020-07-29

## 2020-07-29 RX ORDER — GLIPIZIDE 5 MG/1
TABLET ORAL
Qty: 90 TABLET | Refills: 0 | Status: ON HOLD | OUTPATIENT
Start: 2020-07-29 | End: 2021-10-13 | Stop reason: HOSPADM

## 2020-07-29 RX ORDER — NEBIVOLOL HYDROCHLORIDE 10 MG/1
TABLET ORAL
Qty: 90 TABLET | Refills: 0 | Status: ON HOLD | OUTPATIENT
Start: 2020-07-29 | End: 2021-10-13 | Stop reason: HOSPADM

## 2020-07-29 RX ORDER — CLOPIDOGREL BISULFATE 75 MG/1
TABLET ORAL
Qty: 90 TABLET | Refills: 0 | Status: ON HOLD | OUTPATIENT
Start: 2020-07-29 | End: 2021-10-13 | Stop reason: HOSPADM

## 2020-07-29 RX ORDER — FENOFIBRATE 145 MG/1
TABLET, COATED ORAL
Qty: 90 TABLET | Refills: 0 | Status: ON HOLD | OUTPATIENT
Start: 2020-07-29 | End: 2021-10-13 | Stop reason: HOSPADM

## 2020-07-29 RX ORDER — ESOMEPRAZOLE MAGNESIUM 40 MG/1
CAPSULE, DELAYED RELEASE ORAL
Qty: 90 CAPSULE | Refills: 0 | Status: ON HOLD | OUTPATIENT
Start: 2020-07-29 | End: 2021-10-13 | Stop reason: HOSPADM

## 2020-07-29 RX ORDER — ATORVASTATIN CALCIUM 10 MG/1
TABLET, FILM COATED ORAL
Qty: 90 TABLET | Refills: 0 | Status: ON HOLD | OUTPATIENT
Start: 2020-07-29 | End: 2021-10-13 | Stop reason: HOSPADM

## 2020-07-29 RX ORDER — AMLODIPINE BESYLATE 5 MG/1
TABLET ORAL
Qty: 90 TABLET | Refills: 0 | Status: ON HOLD | OUTPATIENT
Start: 2020-07-29 | End: 2021-10-13 | Stop reason: HOSPADM

## 2020-07-29 RX ORDER — LISINOPRIL 30 MG/1
TABLET ORAL
Qty: 90 TABLET | Refills: 0 | Status: ON HOLD | OUTPATIENT
Start: 2020-07-29 | End: 2021-10-13 | Stop reason: HOSPADM

## 2020-07-29 RX ORDER — LINAGLIPTIN 5 MG/1
TABLET, FILM COATED ORAL
Qty: 90 TABLET | Refills: 0 | Status: ON HOLD | OUTPATIENT
Start: 2020-07-29 | End: 2021-10-13 | Stop reason: HOSPADM

## 2020-08-05 RX ORDER — LISINOPRIL 30 MG/1
TABLET ORAL
Qty: 60 TABLET | Refills: 0 | OUTPATIENT
Start: 2020-08-05

## 2021-02-19 RX ORDER — ATORVASTATIN CALCIUM 10 MG/1
TABLET, FILM COATED ORAL
Qty: 90 TABLET | Refills: 0 | OUTPATIENT
Start: 2021-02-19

## 2021-02-26 RX ORDER — ATORVASTATIN CALCIUM 10 MG/1
TABLET, FILM COATED ORAL
Qty: 90 TABLET | Refills: 0 | OUTPATIENT
Start: 2021-02-26

## 2021-03-16 RX ORDER — ATORVASTATIN CALCIUM 10 MG/1
TABLET, FILM COATED ORAL
Qty: 90 TABLET | Refills: 0 | OUTPATIENT
Start: 2021-03-16

## 2021-10-03 ENCOUNTER — APPOINTMENT (OUTPATIENT)
Dept: GENERAL RADIOLOGY | Age: 76
DRG: 374 | End: 2021-10-03
Payer: MEDICARE

## 2021-10-03 ENCOUNTER — HOSPITAL ENCOUNTER (INPATIENT)
Age: 76
LOS: 9 days | Discharge: HOSPICE | DRG: 374 | End: 2021-10-13
Attending: EMERGENCY MEDICINE | Admitting: INTERNAL MEDICINE
Payer: MEDICARE

## 2021-10-03 ENCOUNTER — APPOINTMENT (OUTPATIENT)
Dept: CT IMAGING | Age: 76
DRG: 374 | End: 2021-10-03
Payer: MEDICARE

## 2021-10-03 DIAGNOSIS — R91.8 ABNORMAL CT SCAN OF LUNG: ICD-10-CM

## 2021-10-03 DIAGNOSIS — E83.52 HYPERCALCEMIA: Primary | ICD-10-CM

## 2021-10-03 DIAGNOSIS — E87.1 HYPONATREMIA: ICD-10-CM

## 2021-10-03 DIAGNOSIS — E16.2 HYPOGLYCEMIA: ICD-10-CM

## 2021-10-03 DIAGNOSIS — C15.5 MALIGNANT NEOPLASM OF LOWER THIRD OF ESOPHAGUS (HCC): ICD-10-CM

## 2021-10-03 DIAGNOSIS — R77.8 ELEVATED TROPONIN: ICD-10-CM

## 2021-10-03 DIAGNOSIS — D73.89 LESION OF SPLEEN: ICD-10-CM

## 2021-10-03 DIAGNOSIS — R93.3 ABNORMAL CT SCAN, ESOPHAGUS: ICD-10-CM

## 2021-10-03 DIAGNOSIS — R09.02 HYPOXIA: ICD-10-CM

## 2021-10-03 DIAGNOSIS — K76.9 LIVER LESION: ICD-10-CM

## 2021-10-03 DIAGNOSIS — E87.20 LACTIC ACIDOSIS: ICD-10-CM

## 2021-10-03 DIAGNOSIS — R59.0 ABDOMINAL LYMPHADENOPATHY: ICD-10-CM

## 2021-10-03 LAB
A/G RATIO: 1.5 (ref 1.1–2.2)
ALBUMIN SERPL-MCNC: 3.4 G/DL (ref 3.4–5)
ALP BLD-CCNC: 42 U/L (ref 40–129)
ALT SERPL-CCNC: 12 U/L (ref 10–40)
ANION GAP SERPL CALCULATED.3IONS-SCNC: 7 MMOL/L (ref 3–16)
AST SERPL-CCNC: 16 U/L (ref 15–37)
BASE EXCESS VENOUS: 0.1 MMOL/L (ref -3–3)
BASOPHILS ABSOLUTE: 0 K/UL (ref 0–0.2)
BASOPHILS RELATIVE PERCENT: 0.8 %
BILIRUB SERPL-MCNC: 0.9 MG/DL (ref 0–1)
BILIRUBIN URINE: ABNORMAL
BLOOD, URINE: NEGATIVE
BUN BLDV-MCNC: 21 MG/DL (ref 7–20)
CALCIUM SERPL-MCNC: 12.5 MG/DL (ref 8.3–10.6)
CARBOXYHEMOGLOBIN: 2.2 % (ref 0–1.5)
CHLORIDE BLD-SCNC: 90 MMOL/L (ref 99–110)
CLARITY: CLEAR
CO2: 27 MMOL/L (ref 21–32)
COLOR: YELLOW
CREAT SERPL-MCNC: 0.8 MG/DL (ref 0.8–1.3)
EOSINOPHILS ABSOLUTE: 0.4 K/UL (ref 0–0.6)
EOSINOPHILS RELATIVE PERCENT: 6.3 %
ETHANOL: NORMAL MG/DL (ref 0–0.08)
GFR AFRICAN AMERICAN: >60
GFR NON-AFRICAN AMERICAN: >60
GLOBULIN: 2.3 G/DL
GLUCOSE BLD-MCNC: 164 MG/DL (ref 70–99)
GLUCOSE BLD-MCNC: 182 MG/DL (ref 70–99)
GLUCOSE BLD-MCNC: 319 MG/DL (ref 70–99)
GLUCOSE BLD-MCNC: 40 MG/DL (ref 70–99)
GLUCOSE BLD-MCNC: 49 MG/DL (ref 70–99)
GLUCOSE URINE: 100 MG/DL
HCO3 VENOUS: 25.8 MMOL/L (ref 23–29)
HCT VFR BLD CALC: 35.9 % (ref 40.5–52.5)
HEMOGLOBIN: 12.7 G/DL (ref 13.5–17.5)
KETONES, URINE: ABNORMAL MG/DL
LACTIC ACID: 2.4 MMOL/L (ref 0.4–2)
LEUKOCYTE ESTERASE, URINE: NEGATIVE
LYMPHOCYTES ABSOLUTE: 0.7 K/UL (ref 1–5.1)
LYMPHOCYTES RELATIVE PERCENT: 11.8 %
MCH RBC QN AUTO: 31.7 PG (ref 26–34)
MCHC RBC AUTO-ENTMCNC: 35.3 G/DL (ref 31–36)
MCV RBC AUTO: 89.7 FL (ref 80–100)
METHEMOGLOBIN VENOUS: 0.4 %
MICROSCOPIC EXAMINATION: ABNORMAL
MONOCYTES ABSOLUTE: 0.3 K/UL (ref 0–1.3)
MONOCYTES RELATIVE PERCENT: 5.4 %
NEUTROPHILS ABSOLUTE: 4.3 K/UL (ref 1.7–7.7)
NEUTROPHILS RELATIVE PERCENT: 75.7 %
NITRITE, URINE: NEGATIVE
O2 SAT, VEN: 66 %
O2 THERAPY: ABNORMAL
PCO2, VEN: 45.4 MMHG (ref 40–50)
PDW BLD-RTO: 12.6 % (ref 12.4–15.4)
PERFORMED ON: ABNORMAL
PH UA: 5.5 (ref 5–8)
PH VENOUS: 7.37 (ref 7.35–7.45)
PLATELET # BLD: 201 K/UL (ref 135–450)
PMV BLD AUTO: 7.8 FL (ref 5–10.5)
PO2, VEN: 37.3 MMHG (ref 25–40)
POTASSIUM SERPL-SCNC: 3.7 MMOL/L (ref 3.5–5.1)
PRO-BNP: 503 PG/ML (ref 0–449)
PROTEIN UA: NEGATIVE MG/DL
RBC # BLD: 4.01 M/UL (ref 4.2–5.9)
SARS-COV-2, NAAT: NOT DETECTED
SODIUM BLD-SCNC: 124 MMOL/L (ref 136–145)
SPECIFIC GRAVITY UA: 1.02 (ref 1–1.03)
SPECIMEN STATUS: NORMAL
TCO2 CALC VENOUS: 27 MMOL/L
TOTAL PROTEIN: 5.7 G/DL (ref 6.4–8.2)
TROPONIN: 0.02 NG/ML
URINE REFLEX TO CULTURE: ABNORMAL
URINE TYPE: ABNORMAL
UROBILINOGEN, URINE: 1 E.U./DL
WBC # BLD: 5.7 K/UL (ref 4–11)

## 2021-10-03 PROCEDURE — 80053 COMPREHEN METABOLIC PANEL: CPT

## 2021-10-03 PROCEDURE — 83935 ASSAY OF URINE OSMOLALITY: CPT

## 2021-10-03 PROCEDURE — 96372 THER/PROPH/DIAG INJ SC/IM: CPT

## 2021-10-03 PROCEDURE — 87040 BLOOD CULTURE FOR BACTERIA: CPT

## 2021-10-03 PROCEDURE — 36415 COLL VENOUS BLD VENIPUNCTURE: CPT

## 2021-10-03 PROCEDURE — 83880 ASSAY OF NATRIURETIC PEPTIDE: CPT

## 2021-10-03 PROCEDURE — 6360000004 HC RX CONTRAST MEDICATION: Performed by: NURSE PRACTITIONER

## 2021-10-03 PROCEDURE — 74177 CT ABD & PELVIS W/CONTRAST: CPT

## 2021-10-03 PROCEDURE — 84484 ASSAY OF TROPONIN QUANT: CPT

## 2021-10-03 PROCEDURE — 83605 ASSAY OF LACTIC ACID: CPT

## 2021-10-03 PROCEDURE — 93005 ELECTROCARDIOGRAM TRACING: CPT | Performed by: EMERGENCY MEDICINE

## 2021-10-03 PROCEDURE — 71260 CT THORAX DX C+: CPT

## 2021-10-03 PROCEDURE — 2500000003 HC RX 250 WO HCPCS

## 2021-10-03 PROCEDURE — 81003 URINALYSIS AUTO W/O SCOPE: CPT

## 2021-10-03 PROCEDURE — 87635 SARS-COV-2 COVID-19 AMP PRB: CPT

## 2021-10-03 PROCEDURE — 71045 X-RAY EXAM CHEST 1 VIEW: CPT

## 2021-10-03 PROCEDURE — 82803 BLOOD GASES ANY COMBINATION: CPT

## 2021-10-03 PROCEDURE — 99285 EMERGENCY DEPT VISIT HI MDM: CPT

## 2021-10-03 PROCEDURE — 82077 ASSAY SPEC XCP UR&BREATH IA: CPT

## 2021-10-03 PROCEDURE — 85025 COMPLETE CBC W/AUTO DIFF WBC: CPT

## 2021-10-03 PROCEDURE — 84300 ASSAY OF URINE SODIUM: CPT

## 2021-10-03 PROCEDURE — 2580000003 HC RX 258: Performed by: NURSE PRACTITIONER

## 2021-10-03 RX ORDER — DEXTROSE MONOHYDRATE 25 G/50ML
INJECTION, SOLUTION INTRAVENOUS
Status: COMPLETED
Start: 2021-10-03 | End: 2021-10-03

## 2021-10-03 RX ORDER — MECLIZINE HYDROCHLORIDE CHEWABLE TABLETS 25 MG/1
TABLET, CHEWABLE ORAL
COMMUNITY
Start: 2021-09-30

## 2021-10-03 RX ORDER — 0.9 % SODIUM CHLORIDE 0.9 %
1000 INTRAVENOUS SOLUTION INTRAVENOUS ONCE
Status: COMPLETED | OUTPATIENT
Start: 2021-10-03 | End: 2021-10-03

## 2021-10-03 RX ORDER — DOCUSATE SODIUM 100 MG/1
CAPSULE, LIQUID FILLED ORAL
COMMUNITY
Start: 2021-09-30

## 2021-10-03 RX ADMIN — DEXTROSE MONOHYDRATE 50 ML: 25 INJECTION, SOLUTION INTRAVENOUS at 23:21

## 2021-10-03 RX ADMIN — DEXTROSE MONOHYDRATE 50 ML: 25 INJECTION, SOLUTION INTRAVENOUS at 21:14

## 2021-10-03 RX ADMIN — IOPAMIDOL 75 ML: 755 INJECTION, SOLUTION INTRAVENOUS at 23:02

## 2021-10-03 RX ADMIN — SODIUM CHLORIDE 1000 ML: 9 INJECTION, SOLUTION INTRAVENOUS at 22:38

## 2021-10-04 PROBLEM — E87.1 HYPONATREMIA: Status: ACTIVE | Noted: 2021-10-04

## 2021-10-04 LAB
ANION GAP SERPL CALCULATED.3IONS-SCNC: 10 MMOL/L (ref 3–16)
ANION GAP SERPL CALCULATED.3IONS-SCNC: 8 MMOL/L (ref 3–16)
BUN BLDV-MCNC: 15 MG/DL (ref 7–20)
BUN BLDV-MCNC: 18 MG/DL (ref 7–20)
CALCIUM IONIZED: 1.49 MMOL/L (ref 1.12–1.32)
CALCIUM SERPL-MCNC: 12 MG/DL (ref 8.3–10.6)
CALCIUM SERPL-MCNC: 12.1 MG/DL (ref 8.3–10.6)
CHLORIDE BLD-SCNC: 92 MMOL/L (ref 99–110)
CHLORIDE BLD-SCNC: 93 MMOL/L (ref 99–110)
CO2: 25 MMOL/L (ref 21–32)
CO2: 26 MMOL/L (ref 21–32)
CREAT SERPL-MCNC: 0.6 MG/DL (ref 0.8–1.3)
CREAT SERPL-MCNC: 0.6 MG/DL (ref 0.8–1.3)
EKG ATRIAL RATE: 73 BPM
EKG DIAGNOSIS: NORMAL
EKG P AXIS: 51 DEGREES
EKG P-R INTERVAL: 204 MS
EKG Q-T INTERVAL: 390 MS
EKG QRS DURATION: 90 MS
EKG QTC CALCULATION (BAZETT): 429 MS
EKG R AXIS: 19 DEGREES
EKG T AXIS: 55 DEGREES
EKG VENTRICULAR RATE: 73 BPM
FERRITIN: 362.4 NG/ML (ref 30–400)
FOLATE: 2.79 NG/ML (ref 4.78–24.2)
GFR AFRICAN AMERICAN: >60
GFR AFRICAN AMERICAN: >60
GFR NON-AFRICAN AMERICAN: >60
GFR NON-AFRICAN AMERICAN: >60
GLUCOSE BLD-MCNC: 101 MG/DL (ref 70–99)
GLUCOSE BLD-MCNC: 121 MG/DL (ref 70–99)
GLUCOSE BLD-MCNC: 129 MG/DL (ref 70–99)
GLUCOSE BLD-MCNC: 131 MG/DL (ref 70–99)
GLUCOSE BLD-MCNC: 131 MG/DL (ref 70–99)
GLUCOSE BLD-MCNC: 148 MG/DL (ref 70–99)
GLUCOSE BLD-MCNC: 171 MG/DL (ref 70–99)
GLUCOSE BLD-MCNC: 70 MG/DL (ref 70–99)
GLUCOSE BLD-MCNC: 71 MG/DL (ref 70–99)
GLUCOSE BLD-MCNC: 76 MG/DL (ref 70–99)
GLUCOSE BLD-MCNC: 76 MG/DL (ref 70–99)
GLUCOSE BLD-MCNC: 78 MG/DL (ref 70–99)
IRON SATURATION: 22 % (ref 20–50)
IRON: 56 UG/DL (ref 59–158)
LACTIC ACID: 1.5 MMOL/L (ref 0.4–2)
OSMOLALITY URINE: 546 MOSM/KG (ref 390–1070)
OSMOLALITY: 274 MOSM/KG (ref 280–301)
PERFORMED ON: ABNORMAL
PERFORMED ON: NORMAL
PH VENOUS: 7.34 (ref 7.35–7.45)
POTASSIUM REFLEX MAGNESIUM: 3.7 MMOL/L (ref 3.5–5.1)
POTASSIUM SERPL-SCNC: 3.7 MMOL/L (ref 3.5–5.1)
SARS-COV-2: NOT DETECTED
SODIUM BLD-SCNC: 126 MMOL/L (ref 136–145)
SODIUM BLD-SCNC: 128 MMOL/L (ref 136–145)
SODIUM URINE: 32 MMOL/L
TOTAL IRON BINDING CAPACITY: 254 UG/DL (ref 260–445)
TROPONIN: <0.01 NG/ML
VITAMIN B-12: 467 PG/ML (ref 211–911)

## 2021-10-04 PROCEDURE — 36415 COLL VENOUS BLD VENIPUNCTURE: CPT

## 2021-10-04 PROCEDURE — 82728 ASSAY OF FERRITIN: CPT

## 2021-10-04 PROCEDURE — 2700000000 HC OXYGEN THERAPY PER DAY

## 2021-10-04 PROCEDURE — 83970 ASSAY OF PARATHORMONE: CPT

## 2021-10-04 PROCEDURE — 2060000000 HC ICU INTERMEDIATE R&B

## 2021-10-04 PROCEDURE — 82607 VITAMIN B-12: CPT

## 2021-10-04 PROCEDURE — 83540 ASSAY OF IRON: CPT

## 2021-10-04 PROCEDURE — 82330 ASSAY OF CALCIUM: CPT

## 2021-10-04 PROCEDURE — 99221 1ST HOSP IP/OBS SF/LOW 40: CPT | Performed by: INTERNAL MEDICINE

## 2021-10-04 PROCEDURE — 83930 ASSAY OF BLOOD OSMOLALITY: CPT

## 2021-10-04 PROCEDURE — 80048 BASIC METABOLIC PNL TOTAL CA: CPT

## 2021-10-04 PROCEDURE — 82746 ASSAY OF FOLIC ACID SERUM: CPT

## 2021-10-04 PROCEDURE — 94761 N-INVAS EAR/PLS OXIMETRY MLT: CPT

## 2021-10-04 PROCEDURE — 2580000003 HC RX 258: Performed by: INTERNAL MEDICINE

## 2021-10-04 PROCEDURE — 6360000002 HC RX W HCPCS: Performed by: INTERNAL MEDICINE

## 2021-10-04 PROCEDURE — 6360000002 HC RX W HCPCS: Performed by: NURSE PRACTITIONER

## 2021-10-04 PROCEDURE — 84238 ASSAY NONENDOCRINE RECEPTOR: CPT

## 2021-10-04 PROCEDURE — 83550 IRON BINDING TEST: CPT

## 2021-10-04 PROCEDURE — U0003 INFECTIOUS AGENT DETECTION BY NUCLEIC ACID (DNA OR RNA); SEVERE ACUTE RESPIRATORY SYNDROME CORONAVIRUS 2 (SARS-COV-2) (CORONAVIRUS DISEASE [COVID-19]), AMPLIFIED PROBE TECHNIQUE, MAKING USE OF HIGH THROUGHPUT TECHNOLOGIES AS DESCRIBED BY CMS-2020-01-R: HCPCS

## 2021-10-04 PROCEDURE — 93010 ELECTROCARDIOGRAM REPORT: CPT | Performed by: INTERNAL MEDICINE

## 2021-10-04 PROCEDURE — 2580000003 HC RX 258: Performed by: NURSE PRACTITIONER

## 2021-10-04 PROCEDURE — U0005 INFEC AGEN DETEC AMPLI PROBE: HCPCS

## 2021-10-04 PROCEDURE — 6370000000 HC RX 637 (ALT 250 FOR IP): Performed by: INTERNAL MEDICINE

## 2021-10-04 RX ORDER — HEPARIN SODIUM 5000 [USP'U]/ML
5000 INJECTION, SOLUTION INTRAVENOUS; SUBCUTANEOUS EVERY 8 HOURS SCHEDULED
Status: DISPENSED | OUTPATIENT
Start: 2021-10-04 | End: 2021-10-07

## 2021-10-04 RX ORDER — ACETAMINOPHEN 650 MG/1
650 SUPPOSITORY RECTAL EVERY 6 HOURS PRN
Status: DISCONTINUED | OUTPATIENT
Start: 2021-10-04 | End: 2021-10-13 | Stop reason: HOSPADM

## 2021-10-04 RX ORDER — SODIUM CHLORIDE 0.9 % (FLUSH) 0.9 %
10 SYRINGE (ML) INJECTION PRN
Status: DISCONTINUED | OUTPATIENT
Start: 2021-10-04 | End: 2021-10-13 | Stop reason: HOSPADM

## 2021-10-04 RX ORDER — POTASSIUM CHLORIDE 7.45 MG/ML
10 INJECTION INTRAVENOUS PRN
Status: DISCONTINUED | OUTPATIENT
Start: 2021-10-04 | End: 2021-10-13 | Stop reason: HOSPADM

## 2021-10-04 RX ORDER — ACETAMINOPHEN 325 MG/1
650 TABLET ORAL EVERY 6 HOURS PRN
Status: DISCONTINUED | OUTPATIENT
Start: 2021-10-04 | End: 2021-10-13 | Stop reason: HOSPADM

## 2021-10-04 RX ORDER — FENOFIBRATE 160 MG/1
160 TABLET ORAL DAILY
Refills: 0 | Status: DISCONTINUED | OUTPATIENT
Start: 2021-10-04 | End: 2021-10-10

## 2021-10-04 RX ORDER — PANTOPRAZOLE SODIUM 40 MG/1
40 TABLET, DELAYED RELEASE ORAL
Refills: 0 | Status: DISCONTINUED | OUTPATIENT
Start: 2021-10-04 | End: 2021-10-12

## 2021-10-04 RX ORDER — ONDANSETRON 2 MG/ML
4 INJECTION INTRAMUSCULAR; INTRAVENOUS EVERY 6 HOURS PRN
Status: DISCONTINUED | OUTPATIENT
Start: 2021-10-04 | End: 2021-10-13 | Stop reason: HOSPADM

## 2021-10-04 RX ORDER — CLOPIDOGREL BISULFATE 75 MG/1
75 TABLET ORAL DAILY
Status: DISPENSED | OUTPATIENT
Start: 2021-10-04 | End: 2021-10-07

## 2021-10-04 RX ORDER — SODIUM CHLORIDE 9 MG/ML
25 INJECTION, SOLUTION INTRAVENOUS PRN
Status: DISCONTINUED | OUTPATIENT
Start: 2021-10-04 | End: 2021-10-13 | Stop reason: HOSPADM

## 2021-10-04 RX ORDER — OCTREOTIDE ACETATE 100 UG/ML
100 INJECTION, SOLUTION INTRAVENOUS; SUBCUTANEOUS ONCE
Status: COMPLETED | OUTPATIENT
Start: 2021-10-04 | End: 2021-10-04

## 2021-10-04 RX ORDER — FUROSEMIDE 10 MG/ML
20 INJECTION INTRAMUSCULAR; INTRAVENOUS ONCE
Status: COMPLETED | OUTPATIENT
Start: 2021-10-04 | End: 2021-10-04

## 2021-10-04 RX ORDER — MAGNESIUM SULFATE IN WATER 40 MG/ML
2000 INJECTION, SOLUTION INTRAVENOUS PRN
Status: DISCONTINUED | OUTPATIENT
Start: 2021-10-04 | End: 2021-10-13 | Stop reason: HOSPADM

## 2021-10-04 RX ORDER — ATORVASTATIN CALCIUM 10 MG/1
10 TABLET, FILM COATED ORAL DAILY
Status: DISCONTINUED | OUTPATIENT
Start: 2021-10-04 | End: 2021-10-13 | Stop reason: HOSPADM

## 2021-10-04 RX ORDER — PROMETHAZINE HYDROCHLORIDE 25 MG/1
12.5 TABLET ORAL EVERY 6 HOURS PRN
Status: DISCONTINUED | OUTPATIENT
Start: 2021-10-04 | End: 2021-10-13 | Stop reason: HOSPADM

## 2021-10-04 RX ORDER — DOCUSATE SODIUM 100 MG/1
200 CAPSULE, LIQUID FILLED ORAL 2 TIMES DAILY
Status: DISCONTINUED | OUTPATIENT
Start: 2021-10-04 | End: 2021-10-04 | Stop reason: DRUGHIGH

## 2021-10-04 RX ORDER — DOCUSATE SODIUM 100 MG/1
100 CAPSULE, LIQUID FILLED ORAL DAILY
Status: DISCONTINUED | OUTPATIENT
Start: 2021-10-04 | End: 2021-10-13 | Stop reason: HOSPADM

## 2021-10-04 RX ORDER — SODIUM CHLORIDE 0.9 % (FLUSH) 0.9 %
10 SYRINGE (ML) INJECTION EVERY 12 HOURS SCHEDULED
Status: DISCONTINUED | OUTPATIENT
Start: 2021-10-04 | End: 2021-10-13 | Stop reason: HOSPADM

## 2021-10-04 RX ORDER — DEXTROSE AND SODIUM CHLORIDE 5; .9 G/100ML; G/100ML
INJECTION, SOLUTION INTRAVENOUS CONTINUOUS
Status: DISCONTINUED | OUTPATIENT
Start: 2021-10-04 | End: 2021-10-05

## 2021-10-04 RX ADMIN — ATORVASTATIN CALCIUM 10 MG: 10 TABLET, FILM COATED ORAL at 09:48

## 2021-10-04 RX ADMIN — HEPARIN SODIUM 5000 UNITS: 5000 INJECTION INTRAVENOUS; SUBCUTANEOUS at 06:08

## 2021-10-04 RX ADMIN — DEXTROSE AND SODIUM CHLORIDE: 5; 900 INJECTION, SOLUTION INTRAVENOUS at 00:53

## 2021-10-04 RX ADMIN — CLOPIDOGREL BISULFATE 75 MG: 75 TABLET, FILM COATED ORAL at 09:48

## 2021-10-04 RX ADMIN — SODIUM CHLORIDE, PRESERVATIVE FREE 10 ML: 5 INJECTION INTRAVENOUS at 09:29

## 2021-10-04 RX ADMIN — FENOFIBRATE 160 MG: 160 TABLET ORAL at 10:44

## 2021-10-04 RX ADMIN — HEPARIN SODIUM 5000 UNITS: 5000 INJECTION INTRAVENOUS; SUBCUTANEOUS at 21:40

## 2021-10-04 RX ADMIN — FUROSEMIDE 20 MG: 10 INJECTION, SOLUTION INTRAMUSCULAR; INTRAVENOUS at 05:23

## 2021-10-04 RX ADMIN — ZOLEDRONIC ACID 4 MG: 4 INJECTION, SOLUTION, CONCENTRATE INTRAVENOUS at 02:19

## 2021-10-04 RX ADMIN — OCTREOTIDE ACETATE 100 MCG: 100 INJECTION, SOLUTION INTRAVENOUS; SUBCUTANEOUS at 00:56

## 2021-10-04 ASSESSMENT — PAIN SCALES - GENERAL
PAINLEVEL_OUTOF10: 3
PAINLEVEL_OUTOF10: 0

## 2021-10-04 NOTE — ED PROVIDER NOTES
I independently performed a history and physical on [facility]. All diagnostic, treatment, and disposition decisions were made by myself in conjunction with the advanced practice provider. For further details of [facility] emergency department encounter, please see Thalia Patel NP's documentation. Patient is a 72-year-old male presenting today with worsening fatigue and generalized weakness over the last 6 weeks. He states he has lost a lot of weight over the last couple of months. He does have a history of smoking and alcohol use and normally drinks at least 6 drinks per day although states the last time he drank or smoked anything was 8 days ago. He states he was feeling much worse when he first arrived but by the time I saw me to receive some IV fluids and was feeling somewhat better although still felt fatigued. He denies any current chest pain although has had some intermittently recently. He denies any fever. He does not believe he got Covid. He states he has had issues with constipation along with trouble with urination as well. He denies any significant headaches. He denies any falls or trauma. He denies any unilateral numbness or weakness but feels weak all over. Due to worsening symptoms, he finally came to the ED for further assessment by EMS. He was initially hypoglycemic on arrival.    Physical:   Gen: No acute distress. AOx3. Ill-appearing, pale, nontoxic  Psych: Depressed mood and affect  HEENT: NCAT, dry MM  Neck: supple, normal ROM, NTTP  Cardiac: RRR, pulses 2+ in upper extremities  Lungs: No respiratory distress, scattered wheezing and rhonchi noted throughout lung fields  Abdomen: soft and nontender with no R/D/G  Neuro: GCS equals 15, moving all extremities equally    The Ekg interpreted by me shows  normal sinus rhythm with a rate of 73  Axis is   Normal  QTc is  normal  Intervals and Durations are unremarkable.       ST Segments: no acute change and normal  No significant change from prior EKG dated - 6/14/18  No STEMI       MDM: Patient was evaluated due to concern for worsening generalized weakness along with weight loss. He was hypoglycemic on arrival which did improve with D50. His initial calcium was severely elevated concerning for possibility of cancer origin and therefore he was given IV fluids for this along with CT of the chest and abdomen were obtained to further assess for this possibility. He is aware that he will need to be admitted for further evaluation due to concern for hypercalcemia along with hypoglycemia and hyponatremia. He is currently stable for for admission with telemetry. No concern for airway compromise at this time. He felt comfortable with this plan.      Lauren Miranda MD  10/03/21 2714

## 2021-10-04 NOTE — ED NOTES
Dr. Jordan Muniz aware that D50 had to be given for blood glucose of 49 upon pt arrival to ED. BG now 182. Pt alert and oriented, VSS, 2LO2 NC (room air baseline).      Parisa Doss RN  10/03/21 2389

## 2021-10-04 NOTE — CONSULTS
[id number] [facility],  [address] [place] [address] Ave  [place], [address] [place] [address]  Phone: [phone]   [facility] He is a  [5] White (non-) [1] 78 y.o. . male      Main Problems/Chief Complaint for which GI service is seeing pt     Suspected malignancy in UGI tract on CT    Clinical Summary      The patient has been having progressive dysphagia to solids for over a period of several months. For last 2 to 3 weeks his oral intake has been significantly limited to puréed foods and liquids. He had to drink a lot of liquid after eating anything that is not liquid. He has lost about 35 pounds in last 3 months. His reflux problem does not appear to be severe to him. To his knowledge, he has not had any blood in the stool. He has smoked heavily in the past and has been heavy alcohol user. The patient has hypercalcemia and is admitted with multiple medical problems. His last Na was 126 and Ca > 12. PAST MEDICAL HISTORY     Past Medical History:   Diagnosis Date    Cerebral artery occlusion with cerebral infarction ([address]     Depression     Diabetes mellitus ([address]     Diverticulitis     Fatty liver     GERD (gastroesophageal reflux disease)     Hyperlipidemia     Hypertension     Osteoarthritis      FAMILY HISTORY     Family History   Problem Relation Age of Onset    Cancer Mother     Heart Disease Father     Cancer Father        SURGICAL HISTORY     Past Surgical History:   Procedure Laterality Date    HERNIA REPAIR      LAPAROTOMY  06/14/2018    with dr Melodie Rankin 6/14/18     CURRENT MEDICATIONS   (This list may include medications prescribed during this encounter as epic can not insert only the list prior to this encounter.)      ALLERGIES     Allergies   Allergen Reactions    Rosuvastatin      Other reaction(s): Muscle Aches           PHYSICAL EXAM   Vitals as per nursing record. Heart: WNL  Lungs: Clear to A&P  Abd: soft, non-tender, no masses are felt. BS: +   Neurologic: Alert & oriented x 3. Psych: Good mood and memory. Pt is able to make appropriate decisions. FINAL IMPRESSION AND RECOMMENDATIONS     Patient is suspected of having esophageal cancer on the basis of history of his symptoms and exposure to at least two risk factorsalcohol and smoking. GERD may have also played a role. Gastric CA is also possible. GI malignancies can be related to hypercalcemia and with the CT findings this possibility will have to be addressed by an EGD when the patient's Na, Mg and Ca are in acceptable range. Tumor secretion of parathyroid hormone-related protein (PTHrP) occurs more commonly with squamous cell CA (esophageal in this case). It is associated with lowe PTH level. Ectopic PTH secretion occurs more commonly with gastric CA and will have high PTH level. Both can lead to hypercalcemia. The total time spent face-to-face, review of the record and on the rivas during this visit was 30 minutes with more than 50% of the time spent in review of the electronic record which showed evidence of possible malignancy involving the esophagus and its independent analysis revealing hyponatremia, hypomagnesemia and hypercalcemia,  coordination of care and management for EGD and counseling the patient for the risks, e\benefits and alternatives to EGD. Sanjeev Gann MD 10/4/21 7:00 PM EDT    CC:  SUDHIR Hurst - CNP      IMPORTANT: Please note that some portions of this note may have been created using Dragon voice recognition software. Some \"sound-alike\" and totally wrong word substitutions may have taken place due to known inherent limitations of any such software, including this voice recognition software. In spite of efforts to eliminate such errors, some may not have been corrected. So please read the note with this in mind and recognize such mistakes and understand the correct version using the  context. Thanks.

## 2021-10-04 NOTE — CONSULTS
tablet 40 mg  40 mg Oral QAM AC Ahmad MAGNO Cooperzi, DO        docusate sodium (COLACE) capsule 200 mg  200 mg Oral BID Ahmad A Lizet, DO        sodium chloride flush 0.9 % injection 10 mL  10 mL IntraVENous 2 times per day Nichol Teresa A Lizet, DO   10 mL at 10/04/21 0929    sodium chloride flush 0.9 % injection 10 mL  10 mL IntraVENous PRN Omermajones Maravillajazi, DO        0.9 % sodium chloride infusion  25 mL IntraVENous PRN [facility] Lizet, DO        potassium chloride 10 mEq/100 mL IVPB (Peripheral Line)  10 mEq IntraVENous PRN [facility] Lizet, DO        magnesium sulfate 2000 mg in 50 mL IVPB premix  2,000 mg IntraVENous PRN [facility] Lizet, DO        promethazine (PHENERGAN) tablet 12.5 mg  12.5 mg Oral Q6H PRN Arianned A Lizet, DO        Or    ondansetron (ZOFRAN) injection 4 mg  4 mg IntraVENous Q6H PRN [facility] Lizet, DO        acetaminophen (TYLENOL) tablet 650 mg  650 mg Oral Q6H PRN Arianned A Lizet, DO        Or    acetaminophen (TYLENOL) suppository 650 mg  650 mg Rectal Q6H PRN Arianned MAGNO MaravillaLizet, DO           Allergies: Allergies   Allergen Reactions    Rosuvastatin      Other reaction(s): Muscle Aches       Social History:     Social History     Socioeconomic History    Marital status:       Spouse name: Not on file    Number of children: Not on file    Years of education: Not on file    Highest education level: Not on file   Occupational History    Not on file   Tobacco Use    Smoking status: Current Every Day Smoker     Packs/day: 1.00     Years: 59.00     Pack years: 59.00     Types: Cigarettes    Smokeless tobacco: Never Used   Vaping Use    Vaping Use: Never used   Substance and Sexual Activity    Alcohol use: Not Currently     Comment: States hasn't drank in eight days    Drug use: No    Sexual activity: Not on file   Other Topics Concern    Not on file   Social History Narrative    Not on file     Social Determinants of Health     Financial Resource Strain:     Difficulty of Paying Living Expenses:    Food Insecurity:     Worried About [address] in the Last Year:     [address] in the Last Year:    Transportation Needs:     Lack of Transportation (Medical):  Lack of Transportation (Non-Medical):    Physical Activity:     Days of Exercise per Week:     Minutes of Exercise per Session:    Stress:     Feeling of Stress :    Social Connections:     Frequency of Communication with Friends and Family:     Frequency of Social Gatherings with Friends and Family:     Attends [employer] Services:     Active Member of Clubs or Organizations:     Attends Club or Organization Meetings:     Marital Status:    Intimate Partner Violence:     Fear of Current or Ex-Partner:     Emotionally Abused:     Physically Abused:     Sexually Abused:      Social History     Substance and Sexual Activity   Drug Use No     Social History     Substance and Sexual Activity   Alcohol Use Not Currently    Comment: States hasn't drank in eight days     Social History     Substance and Sexual Activity   Sexual Activity Not on file     Social History     Tobacco Use   Smoking Status Current Every Day Smoker    Packs/day: 1.00    Years: 59.00    Pack years: 59.00    Types: Cigarettes   Smokeless Tobacco Never Used       Family History:     Family History   Problem Relation Age of Onset    Cancer Mother     Heart Disease Father     Cancer Father        Review of Systems:     Constitutional: Denies fever, sweats, weight loss. .     Eyes: No visual changes or diplopia. No scleral icterus. ENT: No Headaches, no hearing loss, no  vertigo. No mouth sores or sore throat. Cardiovascular: No chest pain, no dyspnea on exertion, no palpitations, no  loss of consciousness. Respiratory: No cough, no  wheezing, no dyspnea, no sputum production. No hemoptysis. .    Gastrointestinal: No abdominal pain, no appetite loss, no blood in stools. No change in bowel habits.   Genitourinary: No dysuria, trouble voiding, or hematuria. Musculoskeletal:  Generalized weakness. No joint complaints. Integumentary: No rash or pruritis. Neurological: No headache, diplopia. No change in gait, balance, or coordination. No paresthesias. Endocrine: No temperature intolerance. No excessive thirst, fluid intake, or urination. Hematologic/Lymphatic: No abnormal bruising or ecchymoses, no blood clots or swollen lymph nodes. Allergic/Immunologic: No nasal congestion or hives. Physical Exam:     BP (!) 148/79   Pulse 65   Temp 97.9 °F (36.6 °C) (Oral)   Resp 16   Ht 5' 8\" (1.727 m)   Wt 165 lb 5.5 oz (75 kg)   SpO2 98%   BMI 25.14 kg/m²     CONSTITUTIONAL: awake, alert, cooperative, no apparent distress. EYES:  Pupils equal, round and reactive to light, sclera non-icteric, conjunctiva normal  ENT:  Normocephalic, without obvious abnormality, atraumatic, sinuses nontender on palpation, external ears without lesions, oral pharynx with moist mucus membranes, no mucositis. NECK:  Supple, symmetrical, trachea midline, no adenopathy, thyroid symmetric, not enlarged and no tenderness, skin normal  HEMATOLOGIC/LYMPHATICS:  no cervical lymphadenopathy, no supraclavicular lymphadenopathy, no axillary lymphadenopathy and no inguinal lymphadenopathy  BACK:  Symmetric, no curvature, spinous processes are non-tender on palpation, paraspinous muscles are non-tender on palpation, no costal vertebral tenderness  LUNGS:  Clear to auscultation bilaterally, no crackles or wheezing  CARDIOVASCULAR:  Regular rate and rhythm, normal S1 and S2, no S3 or S4, and no murmur noted  ABDOMEN:  Normal bowel sounds, soft, non-distended, non-tender, no masses palpated, no hepatosplenomegally  MUSCULOSKELETAL:  There is no redness, warmth, or swelling of the joints  NEUROLOGIC:   No focal findings. SKIN:  Scattered bruising and ecchymoses. EXT: without clubbing, cyanosis or edema.       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 Beryle Marek to see in the AM.      Thank you very much for allowing me to participate in the care of this patient.     Bautista Dias, JASMYNE   Medical Oncology/Hematology    [facility] - [address],  [place] [address]  Phone: [phone]  Fax: [phone]

## 2021-10-04 NOTE — ED NOTES
Blood culture set (#1) drawn from right ac with butterfly. Bottle tops scrubbed with alcohol pads. Site prepped with Prevantics swab, 15 seconds per side, and allowed to dry for 30 seconds prior to venipuncture. Red waste tube drawn prior to collection of specimen.          Mireya Montgomery  10/04/21 0111

## 2021-10-04 NOTE — CONSULTS
I have received a GI consultation request for possible upper GI malignancy. The electronic medical record was reviewed. The patient has significant abnormalities of her electrolytes which are prohibitive for an elective upper endoscopy. Once these are corrected, I will plan for an upper endoscopy and see the patient by the bedside and discussed with the patient risk benefits and alternatives. Full consult to follow.

## 2021-10-04 NOTE — ED NOTES
Pts blood glucose dropped to 40 again. Another amp of D50 given per Dr. Travis Spikes verbal order.      Ugo Rodríguez RN  10/03/21 9725

## 2021-10-04 NOTE — PROGRESS NOTES
Report given to Vamsi Allison RN  Pt BS this am 171. Pt given    Ice chips.  Pt placed in droplet precations

## 2021-10-04 NOTE — ED PROVIDER NOTES
Evaluated by [id number] [facility] Provider    [facility]  ED      CHIEF COMPLAINT  Fatigue (Pt c/o generalized weakness for the past six weeks. Caregiver called EMS today. Pt denies pain.) and Hypoglycemia (BG 49 during triage. Amp of D50 given.)    HISTORY OF PRESENT ILLNESS  Anahi Bean is a 78 y.o. male who presents to the ED complaining of fatigue. States they are telling him he is dehydrated and doing some tests. He is having a lot of vertigo, dizziness, light headedness for a number of weeks. Also reports being very weak. His home caregiver sent him here today for evaluation. States not feeling very well, can not have any BM, very little urination. He hardly eats or drinks anything, he does not appetite, everything right now for some reason tastes to sweet to eat. This has been going on for 4-5 weeks. This is what is causing his lack of appetite. He is weak, stumbling around, doesn't go out so hasn't fallen. Denies nausea, vomiting. He has pressure in his abdomen, has a lot of gas. He gets a little bit of pain in his chest-indigestion. Gas he doesn't release from his body floats around his chest.  Has not drank in 8 days because of the severe dizziness and vertigo. Does not wear oxygen at home. Does have a known abdominal hernia. The patient is currently rating their pain as 0/10. The patient arrived to the ED via EMS transport.     PAST MEDICAL HISTORY    Past Medical History:   Diagnosis Date    Cerebral artery occlusion with cerebral infarction ([address]     Depression     Diabetes mellitus ([address]     Diverticulitis     Fatty liver     GERD (gastroesophageal reflux disease)     Hyperlipidemia     Hypertension     Osteoarthritis        SURGICAL HISTORY    Past Surgical History:   Procedure Laterality Date    HERNIA REPAIR      LAPAROTOMY  06/14/2018    with dr Rika Vanessa 6/14/18       CURRENT MEDICATIONS    Current Outpatient Rx   Medication Sig Dispense Refill    docusate sodium (COLACE) 100 MG capsule       clopidogrel (PLAVIX) 75 MG tablet TAKE 1 TABLET ONCE DAILY 90 tablet 0    amLODIPine (NORVASC) 5 MG tablet TAKE 1 TABLET BY MOUTH EVERY DAY 90 tablet 0    fenofibrate (TRICOR) 145 MG tablet TAKE 1 TABLET BY MOUTH EVERY DAY 90 tablet 0    lisinopril (PRINIVIL;ZESTRIL) 30 MG tablet TALE 1 TABLET BY MOUTH EVERY DAY 90 tablet 0    TRADJENTA 5 MG tablet TAKE 1 TABLET BY MOUTH EVERY DAY 90 tablet 0    BYSTOLIC 10 MG tablet TAKE 1 TABLET BY MOUTH EVERY DAY 90 tablet 0    atorvastatin (LIPITOR) 10 MG tablet TAKE 1 TABLET BY MOUTH EVERY DAY 90 tablet 0    glipiZIDE (GLUCOTROL) 5 MG tablet TAKE ONE TABLET BY MOUTH DAILY 90 tablet 0    esomeprazole (NEXIUM) 40 MG delayed release capsule TAKE 1 CAPSULE ONCE DAILY 90 capsule 0    vitamin D (ERGOCALCIFEROL) 1.25 MG (49444 UT) CAPS capsule TAKE 1 CAPSULE ONCE A WEEK 5 capsule 2    esomeprazole Magnesium (NEXIUM) 40 MG PACK Take 1 packet by mouth daily 30 packet 2    meclizine (ANTIVERT) 25 MG CHEW          ALLERGIES    Allergies   Allergen Reactions    Rosuvastatin      Other reaction(s): Muscle Aches       FAMILY HISTORY    Family History   Problem Relation Age of Onset    Cancer Mother     Heart Disease Father     Cancer Father        SOCIAL HISTORY    Social History     Socioeconomic History    Marital status:       Spouse name: None    Number of children: None    Years of education: None    Highest education level: None   Occupational History    None   Tobacco Use    Smoking status: Current Every Day Smoker     Packs/day: 1.00     Years: 59.00     Pack years: 59.00     Types: Cigarettes    Smokeless tobacco: Never Used   Vaping Use    Vaping Use: Never used   Substance and Sexual Activity    Alcohol use: Not Currently     Comment: States hasn't drank in eight days    Drug use: No    Sexual activity: None   Other Topics Concern    None   Social History Narrative    None     Social Determinants of Health Financial Resource Strain:     Difficulty of Paying Living Expenses:    Food Insecurity:     Worried About Running Out of Food in the Last Year:     [address] in the Last Year:    Transportation Needs:     Lack of Transportation (Medical):  Lack of Transportation (Non-Medical):    Physical Activity:     Days of Exercise per Week:     Minutes of Exercise per Session:    Stress:     Feeling of Stress :    Social Connections:     Frequency of Communication with Friends and Family:     Frequency of Social Gatherings with Friends and Family:     Attends [employer] Services:     Active Member of Clubs or Organizations:     Attends Club or Organization Meetings:     Marital Status:    Intimate Partner Violence:     Fear of Current or Ex-Partner:     Emotionally Abused:     Physically Abused:     Sexually Abused:        REVIEW OF SYSTEMS    10 systems reviewed, pertinent positives per HPI otherwise noted to be negative    PHYSICAL EXAM  Vitals:    10/04/21 0053   BP: 129/85   Pulse: 82   Resp: 20   Temp:    SpO2: 97%     GENERAL: Patient is elderly, well-nourished. Awake and alert. Cooperative. Resting in bed. No apparent distress. HEENT:  Normocephalic, atraumatic. Conjunctiva appear normal. Sclera is non-icteric. External ears are normal.    NECK: Supple with normal ROM. Trachea midline. LUNGS: Equal and symmetric chest rise. Breathing is unlabored. Speaking comfortably in full sentences. Lungs are with rales to bilateral bases. Without wheezing or rhonchi. CADIOVASCULAR:  Regular rate and rhythm. Normal S1-S2 sounds. No murmurs, rubs, or gallops. Capillary refill is brisk in all 4 extremities. Bilateral lower extremities are equal in size, there is no swelling observed. There is no tenderness to palpation. There is no erythema observed or warmth palpated. GI: Soft, nontender, nondistended with positive bowel sounds. No rebound tenderness, guarding or any peritoneal signs.   No masses or hepatosplenomegaly    MUSCULOSKELETAL:  No gross deformities or trauma noted. Moving all extremities equally and appropriately. Normal ROM. SKIN: Warm/dry. Skin is intact. No rashes/lesions noted. PSYCHIATRIC: Mood and affect appropriate. Speech is clear and articulate. NEUROLOGIC: Alert and oriented. No focal motor or sensory deficits. LABS  I have reviewed all labs for this visit.    Results for orders placed or performed during the hospital encounter of 10/03/21   COVID-19, Rapid    Specimen: Nasopharyngeal Swab; Throat   Result Value Ref Range    SARS-CoV-2, NAAT Not Detected Not Detected   Comprehensive metabolic panel   Result Value Ref Range    Sodium 124 (L) 136 - 145 mmol/L    Potassium 3.7 3.5 - 5.1 mmol/L    Chloride 90 (L) 99 - 110 mmol/L    CO2 27 21 - 32 mmol/L    Anion Gap 7 3 - 16    Glucose 319 (H) 70 - 99 mg/dL    BUN 21 (H) 7 - 20 mg/dL    CREATININE 0.8 0.8 - 1.3 mg/dL    GFR Non-African American >60 >60    GFR African American >60 >60    Calcium 12.5 (HH) 8.3 - 10.6 mg/dL    Total Protein 5.7 (L) 6.4 - 8.2 g/dL    Albumin 3.4 3.4 - 5.0 g/dL    Albumin/Globulin Ratio 1.5 1.1 - 2.2    Total Bilirubin 0.9 0.0 - 1.0 mg/dL    Alkaline Phosphatase 42 40 - 129 U/L    ALT 12 10 - 40 U/L    AST 16 15 - 37 U/L    Globulin 2.3 g/dL   CBC Auto Differential   Result Value Ref Range    WBC 5.7 4.0 - 11.0 K/uL    RBC 4.01 (L) 4.20 - 5.90 M/uL    Hemoglobin 12.7 (L) 13.5 - 17.5 g/dL    Hematocrit 35.9 (L) 40.5 - 52.5 %    MCV 89.7 80.0 - 100.0 fL    MCH 31.7 26.0 - 34.0 pg    MCHC 35.3 31.0 - 36.0 g/dL    RDW 12.6 12.4 - 15.4 %    Platelets 314 982 - 763 K/uL    MPV 7.8 5.0 - 10.5 fL    Neutrophils % 75.7 %    Lymphocytes % 11.8 %    Monocytes % 5.4 %    Eosinophils % 6.3 %    Basophils % 0.8 %    Neutrophils Absolute 4.3 1.7 - 7.7 K/uL    Lymphocytes Absolute 0.7 (L) 1.0 - 5.1 K/uL    Monocytes Absolute 0.3 0.0 - 1.3 K/uL    Eosinophils Absolute 0.4 0.0 - 0.6 K/uL    Basophils Absolute 0.0 0.0 - 0.2 K/uL   Urine, reflex to culture    Specimen: Urine, clean catch   Result Value Ref Range    Color, UA Yellow Straw/Yellow    Clarity, UA Clear Clear    Glucose, Ur 100 (A) Negative mg/dL    Bilirubin Urine SMALL (A) Negative    Ketones, Urine TRACE (A) Negative mg/dL    Specific Gravity, UA 1.025 1.005 - 1.030    Blood, Urine Negative Negative    pH, UA 5.5 5.0 - 8.0    Protein, UA Negative Negative mg/dL    Urobilinogen, Urine 1.0 <2.0 E.U./dL    Nitrite, Urine Negative Negative    Leukocyte Esterase, Urine Negative Negative    Microscopic Examination Not Indicated     Urine Type NotGiven     Urine Reflex to Culture Not Indicated    Troponin   Result Value Ref Range    Troponin 0.02 (H) <0.01 ng/mL   Blood gas, venous   Result Value Ref Range    pH, Greg 7.372 7.350 - 7.450    pCO2, Greg 45.4 40.0 - 50.0 mmHg    pO2, Greg 37.3 25 - 40 mmHg    HCO3, Venous 25.8 23.0 - 29.0 mmol/L    Base Excess, Greg 0.1 -3.0 - 3.0 mmol/L    O2 Sat, Greg 66 Not Established %    Carboxyhemoglobin 2.2 (H) 0.0 - 1.5 %    MetHgb, Greg 0.4 <1.5 %    TC02 (Calc), Greg 27 Not Established mmol/L    O2 Therapy Unknown    Lactic acid, plasma   Result Value Ref Range    Lactic Acid 2.4 (H) 0.4 - 2.0 mmol/L   Brain Natriuretic Peptide   Result Value Ref Range    Pro- (H) 0 - 449 pg/mL   Sample possible blood bank testing   Result Value Ref Range    Specimen Status OLAYINKA    Ethanol   Result Value Ref Range    Ethanol Lvl None Detected mg/dL   Lactic acid, plasma   Result Value Ref Range    Lactic Acid 1.5 0.4 - 2.0 mmol/L   Troponin   Result Value Ref Range    Troponin <0.01 <0.01 ng/mL   POCT Glucose   Result Value Ref Range    POC Glucose 49 (LL) 70 - 99 mg/dl    Performed on ACCU-CHEK    POCT Glucose   Result Value Ref Range    POC Glucose 182 (H) 70 - 99 mg/dl    Performed on ACCU-CHEK    POCT Glucose   Result Value Ref Range    POC Glucose 40 (LL) 70 - 99 mg/dl    Performed on ACCU-CHEK    POCT Glucose   Result Value Ref Range    POC Glucose 164 (H) 70 - 99 mg/dl    Performed on ACCU-CHEK    POCT Glucose   Result Value Ref Range    POC Glucose 121 (H) 70 - 99 mg/dl    Performed on ACCU-CHEK    POCT Glucose   Result Value Ref Range    POC Glucose 76 70 - 99 mg/dl    Performed on ACCU-CHEK    POCT Glucose   Result Value Ref Range    POC Glucose 71 70 - 99 mg/dl    Performed on ACCU-CHEK    POCT Glucose   Result Value Ref Range    POC Glucose 70 70 - 99 mg/dl    Performed on ACCU-CHEK    EKG 12 Lead   Result Value Ref Range    Ventricular Rate 73 BPM    Atrial Rate 73 BPM    P-R Interval 204 ms    QRS Duration 90 ms    Q-T Interval 390 ms    QTc Calculation (Bazett) 429 ms    P Axis 51 degrees    R Axis 19 degrees    T Axis 55 degrees    Diagnosis       Normal sinus rhythmNormal ECGWhen compared with ECG of 14-JUN-2018 10:10,No significant change was found       RADIOLOGY    CT ABDOMEN PELVIS W IV CONTRAST Additional Contrast? None    Result Date: 10/3/2021  EXAMINATION: CT OF THE ABDOMEN AND PELVIS WITH CONTRAST; CTA OF THE CHEST 10/3/2021 10:45 pm TECHNIQUE: CT of the abdomen and pelvis was performed with the administration of intravenous contrast. Multiplanar reformatted images are provided for review. Dose modulation, iterative reconstruction, and/or weight based adjustment of the mA/kV was utilized to reduce the radiation dose to as low as reasonably achievable.; CTA of the chest was performed after the administration of intravenous contrast.  Multiplanar reformatted images are provided for review. MIP images are provided for review. Dose modulation, iterative reconstruction, and/or weight based adjustment of the mA/kV was utilized to reduce the radiation dose to as low as reasonably achievable. COMPARISON: None.  HISTORY: ORDERING SYSTEM PROVIDED HISTORY: not feeling well TECHNOLOGIST PROVIDED HISTORY: Reason for exam:->not feeling well Additional Contrast?->None Decision Support Exception - unselect if not a suspected or confirmed emergency medical condition->Emergency Medical Condition (MA) Reason for Exam: generalized weakness and fatigue Acuity: Acute Type of Exam: Initial; ORDERING SYSTEM PROVIDED HISTORY: hypoxia, COVID neg TECHNOLOGIST PROVIDED HISTORY: Reason for exam:->hypoxia, COVID neg Decision Support Exception - unselect if not a suspected or confirmed emergency medical condition->Emergency Medical Condition (MA) Reason for Exam: generalized wekness and fatigue, sob Acuity: Acute Type of Exam: Initial FINDINGS: CT PA: No PE is identified. The heart size is normal.  There are mild coronary artery calcifications. Mild bilateral hilar lymphadenopathy is noted with right hilar lymph nodes measuring up to 2.3 cm in short axis and left hilar lymph nodes measuring up to 1.3 cm in short axis. Calcified lymph nodes are also present within the mediastinum and left hilum. Mild bilateral pleural effusions are also present. There is diffuse inter and intra lobular thickening of the lungs with a poorly defined airspace opacity within the medial left lung base measuring at least 3.6 x 1.5 cm in diameter. Subsegmental atelectatic changes are noted within the lung bases. There is masslike thickening and edema of the lower esophagus. No acute osseous abnormality is identified. CT abdomen and pelvis: There are indeterminate hypoattenuating lesions within the liver, some of which are cysts. There is also enlargement of the liver with nodularity the liver surface consistent with hepatic cirrhosis. Calcified granulomas are present within the liver and spleen. There is a nonspecific bandlike focus of hypodensity along the inferior pole of the spleen measuring at least 1.7 cm in diameter. No adrenal masses are identified and there is no hydronephrosis. No renal stones are seen. No gallstones are visualized. The pancreas enhances homogeneously. There is no small bowel obstruction, free-air, or free-fluid. There is diverticulosis coli.   The appendix is normal in caliber. The duodenal sweep is within normal limits. There is mild upper abdominal lymphadenopathy with gastrohepatic ligament lymph nodes measuring up to 18 mm in short axis. There are an increased number of subcentimeter in size retroperitoneal lymph nodes. There is significant atherosclerotic disease with severe narrowing of the left common iliac artery due to calcified plaque. Degenerative changes of the spine are present. No PE identified. Masslike thickening and edema of the lower esophagus worrisome for potential malignancy and/or esophagitis. Diffuse intra and interlobular septal thickening of the lungs with poorly defined airspace opacity within the left lung base and bilateral pleural effusions. Findings could reflect pulmonary edema although the possibility of lymphangitic carcinomatosis needs to be excluded. Nonspecific small hypoattenuating lesions within the liver and mild upper abdominal lymphadenopathy. Metastatic disease not excluded. Small focal hypodensity along the inferior pole of the spleen could also reflect a metastatic implant or small infarct. Diverticulosis coli. Atherosclerotic disease resulting in severe narrowing of the left common iliac artery. XR CHEST PORTABLE    Result Date: 10/3/2021  EXAMINATION: ONE XRAY VIEW OF THE CHEST 10/3/2021 9:39 pm COMPARISON: None. HISTORY: ORDERING SYSTEM PROVIDED HISTORY: Cough, hypoxia TECHNOLOGIST PROVIDED HISTORY: Reason for exam:->Cough, hypoxia Reason for Exam: cough and hypoxia, pt chief complaint hypoglycemia Acuity: Acute Type of Exam: Initial FINDINGS: The left ventricle is mildly enlarged. The pulmonary vessels are engorged and ill-defined centrally. There are some hazy interstitial and ground-glass opacities throughout both lungs which is most prominent along the lung bases. No effusion is seen. The bones are intact.      Mild left ventricular enlargement with mild central pulmonary congestion or pulmonary artery hypertension Hazy interstitial ground-glass opacities throughout both lungs which is most prominent in the lung bases and could be due to pulmonary edema and/or multisegmental bronchopneumonia. CT CHEST PULMONARY EMBOLISM W CONTRAST    Result Date: 10/3/2021  EXAMINATION: CT OF THE ABDOMEN AND PELVIS WITH CONTRAST; CTA OF THE CHEST 10/3/2021 10:45 pm TECHNIQUE: CT of the abdomen and pelvis was performed with the administration of intravenous contrast. Multiplanar reformatted images are provided for review. Dose modulation, iterative reconstruction, and/or weight based adjustment of the mA/kV was utilized to reduce the radiation dose to as low as reasonably achievable.; CTA of the chest was performed after the administration of intravenous contrast.  Multiplanar reformatted images are provided for review. MIP images are provided for review. Dose modulation, iterative reconstruction, and/or weight based adjustment of the mA/kV was utilized to reduce the radiation dose to as low as reasonably achievable. COMPARISON: None. HISTORY: ORDERING SYSTEM PROVIDED HISTORY: not feeling well TECHNOLOGIST PROVIDED HISTORY: Reason for exam:->not feeling well Additional Contrast?->None Decision Support Exception - unselect if not a suspected or confirmed emergency medical condition->Emergency Medical Condition (MA) Reason for Exam: generalized weakness and fatigue Acuity: Acute Type of Exam: Initial; ORDERING SYSTEM PROVIDED HISTORY: hypoxia, COVID neg TECHNOLOGIST PROVIDED HISTORY: Reason for exam:->hypoxia, COVID neg Decision Support Exception - unselect if not a suspected or confirmed emergency medical condition->Emergency Medical Condition (MA) Reason for Exam: generalized wekness and fatigue, sob Acuity: Acute Type of Exam: Initial FINDINGS: CT PA: No PE is identified. The heart size is normal.  There are mild coronary artery calcifications.  Mild bilateral hilar lymphadenopathy is noted with right hilar lymph nodes measuring up to 2.3 cm in short axis and left hilar lymph nodes measuring up to 1.3 cm in short axis. Calcified lymph nodes are also present within the mediastinum and left hilum. Mild bilateral pleural effusions are also present. There is diffuse inter and intra lobular thickening of the lungs with a poorly defined airspace opacity within the medial left lung base measuring at least 3.6 x 1.5 cm in diameter. Subsegmental atelectatic changes are noted within the lung bases. There is masslike thickening and edema of the lower esophagus. No acute osseous abnormality is identified. CT abdomen and pelvis: There are indeterminate hypoattenuating lesions within the liver, some of which are cysts. There is also enlargement of the liver with nodularity the liver surface consistent with hepatic cirrhosis. Calcified granulomas are present within the liver and spleen. There is a nonspecific bandlike focus of hypodensity along the inferior pole of the spleen measuring at least 1.7 cm in diameter. No adrenal masses are identified and there is no hydronephrosis. No renal stones are seen. No gallstones are visualized. The pancreas enhances homogeneously. There is no small bowel obstruction, free-air, or free-fluid. There is diverticulosis coli. The appendix is normal in caliber. The duodenal sweep is within normal limits. There is mild upper abdominal lymphadenopathy with gastrohepatic ligament lymph nodes measuring up to 18 mm in short axis. There are an increased number of subcentimeter in size retroperitoneal lymph nodes. There is significant atherosclerotic disease with severe narrowing of the left common iliac artery due to calcified plaque. Degenerative changes of the spine are present. No PE identified. Masslike thickening and edema of the lower esophagus worrisome for potential malignancy and/or esophagitis.  Diffuse intra and interlobular septal thickening of the lungs with poorly defined airspace opacity within the left lung base and bilateral pleural effusions. Findings could reflect pulmonary edema although the possibility of lymphangitic carcinomatosis needs to be excluded. Nonspecific small hypoattenuating lesions within the liver and mild upper abdominal lymphadenopathy. Metastatic disease not excluded. Small focal hypodensity along the inferior pole of the spleen could also reflect a metastatic implant or small infarct. Diverticulosis coli. Atherosclerotic disease resulting in severe narrowing of the left common iliac artery. ED COURSE/MDM  Patient seen and evaluated. Old records reviewed. Diagnostic testing reviewed and results discussed. I have seen and evaluated this patient with supervising physician: Zachary Bess MD. We thoroughly discussed the history, physical exam, diagnostic testing, emergency department course, plan and disposition. Katelyn Issa presented to the ED today with above noted complaints. Arrival vital signs: Afebrile and hemodynamically stable. Patient was found to be hypoxic on arrival.  Placed on 2 L and has been saturating well. Physical exam performed at 2210: Does reveal the patient to have rales to the bilateral bases on exam and no reproducible abdominal tenderness to palpation. On arrival to the ED the patient was also found to be hypoglycemic with a glucose of 49. He was given 1 amp of D50 and glucose elevated. Blood work: No alcohol detected, elevated BNP of 503, elevated troponin of 0.02. Elevated troponin can be trended while admitted. There is no leukocytosis but absolute lymphocytes are low at 0.7. There is a stable anemia. Sodium is noted to be low at 124, chloride low at 90. Calcium elevated at 12.5. No evidence of acute kidney injury or transaminitis. Lactic acidosis is present as lactic acid elevated at 2.4. I feel that this is most likely due to dehydration as the patient has no leukocytosis and no fever.   Patient is also reportedly not been eating or drinking well for the past 4 to 5 weeks. VBG shows normal pH. Repeat lactic acid level was obtained and normal.  This was after given IV fluids and I feel furthers that lactic acidosis was due to dehydration and not infection. EKG: Shows normal sinus rhythm without acute findings. UA: Without evidence of infection. No blood. Imaging: Chest x-ray was obtained and shows mild left ventricular enlargement with mild central pulmonary congestion or pulmonary artery hypertension. There is hazy interstitial groundglass opacities throughout both lungs which is most prominent in lung bases and could be due to pulmonary edema and/or multisegmental bronchopneumonia. Covid swab was obtained and negative. Patient was hypoxic and hypoglycemic on arrival to ER. 2L O2 placed, O2 saturation recovered nicely. 1 full amp of D50 was given and BG up to 182 POC. Patient is diabetic, diminished appetite but still taking oral meds. Despite 2 full amps of D50 patient's blood sugar continues to drop. Because of the glipizide that he has been taking patient was given octreotide and started on a D5 normal saline continuous IV drip to keep his blood sugar elevated. Due to the elevation of the patient's calcium a CT of the chest abdomen and pelvis was obtained. CT chest showed no PE.  CT abdomen pelvis showed masslike thickening and edema of the lower esophagus that is worrisome for potential malignancy and/or esophagitis. There is diffuse intra and interlobular septal thickening of the lungs with poorly defined airspace opacity within the left lung base and bilateral pleural effusions. Findings could reflect pulmonary edema although possibility of lymphangitic carcinomatosis needs to be excluded. Nonspecific small hypoattenuating lesions within the liver and mild abdominal lymphadenopathy. Metastatic disease is not excluded.   Small focal hypodensity along the inferior pole of the spleen that could also reflect a metastatic implant or small infarct. Diverticulosis coli. Atherosclerotic disease resulting in severe narrowing of the left common iliac artery. Medications given in the ED:   Medications   dextrose 5 % and 0.9 % sodium chloride infusion ( IntraVENous New Bag 10/4/21 0053)   dextrose 50 % solution (50 mLs  Given 10/3/21 2114)   0.9 % sodium chloride bolus (0 mLs IntraVENous Stopped 10/3/21 2343)   iopamidol (ISOVUE-370) 76 % injection 75 mL (75 mLs IntraVENous Given 10/3/21 2302)   dextrose 50 % solution (50 mLs  Given 10/3/21 2321)   octreotide (SANDOSTATIN) injection 100 mcg (100 mcg SubCUTAneous Given 10/4/21 0056)      I reviewed the patient's CT scan findings with him and made him aware of the possibility of several areas of malignancy. I advised the patient that due to his electrolyte abnormalities and his hypoxia he will require admission to the hospital for further evaluation and management. Patient is in agreement with this plan. Jan Cifuentes will be admitted for further observation and evaluation of Humberto Byrnes's hypoxia, electrolyte abnormalities. As I have deemed necessary from their history, physical, and studies, I have considered and evaluated Jan Cifuentes for the following diagnoses:  ACUTE CORONARY SYNDROME, CHRONIC OBSTRUCTIVE PULMONARY DISEASE, CONGESTIVE HEART FAILURE, PERICARDIAL TAMPONADE, PNEUMONIA, PNEUMOTHORAX, PULMONARY EMBOLISM, SEPSIS, and THORACIC DISSECTION. The total critical care time spent while evaluating and treating this patient was 53 minutes. This excludes time spent doing separately billable procedures. This includes time at the bedside, data interpretation, medication management, obtaining critical history from collateral sources if the patient is unable to provide it directly, and physician consultation.   Specifics of interventions taken and potentially life-threatening diagnostic considerations are listed above in the medical decision making. CLINICAL IMPRESSION    1. Hypercalcemia    2. Hyponatremia    3. Hypoxia    4. Hypoglycemia    5. Elevated troponin    6. Lactic acidosis    7. Abnormal CT scan, esophagus    8. Abnormal CT scan of lung    9. Lesion of spleen    10. Liver lesion    11. Abdominal lymphadenopathy       DISPOSITION  Admit. Comment: Please note this report has been produced using speech recognition software and may contain errors related to that system including errors in grammar, punctuation, and spelling, as well as words and phrases that may be inappropriate. If there are any questions or concerns please feel free to contact the dictating provider for clarification.        SUDHIR Rowe - JASMYNE  10/04/21 0246

## 2021-10-04 NOTE — PROGRESS NOTES
RN transported pt to room 431 via stretcher. PT placed on remote tele. PT oriented to room, call light system and plan of care. PT vitals stable.  Pt  at 0402

## 2021-10-04 NOTE — CONSULTS
[facility]    [facility]              [phone]                         Interval History and plan:      Sodium coming up appropriately  Calcium very high at 12.1  PTH is pending  Got a dose of Zometa  Plan:    Already got Zometa  Unable to go up on IV fluids due to oxygen requirement  If oxygen requirement goes up will need to discontinue IV fluids  Will take several days for Zometa to bring calcium down to normal  zometa is associated with multiple electrolyte abnormality including hypomagnesemia and hypophosphatemia should monitor daily for now  Getting work-up for malignancy and noted oncology has been consulted                     Assessment :     Hyponatremia  Clinically hypovolemic on presentation  Sodium 124 on admission-coming up to 126 on consultation  Urine sodium 32    He has possible bowel cancer with metastasis  Urine sodium is consistent with hypovolemic but he may also have SIADH component      Hypercalcemia  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 intake-CT abdomen suggestive of possible lower esophagus malignancy, CT scan of the lungs showed septal thickening lymphangitic carcinomatosis versus pulmonary edema  Also nonspecific small hypoattenuating lesions within the liver and mild upper abdominal lymphadenopathy  Possible splenic metastasis      [facility] would like to thank Billy Baker MD   for opportunity to serve this patient      Please call with questions at-   24 Hrs Answering service [phone] or  7 am- 5 pm via Perfect serve or cell phone  Vera Bridges MD          CC/reason for consult :     Hyponatremia/hypercalcemia     HPI :     Sujit Nolasco is a 78 y.o. male presented to   the hospital on 10/3/2021 with fatigue.   He has been eating and drinking very poorly for last several days. Has fatigue for more than a month. Also has dizziness, constipated. Has significant loss of weight. History of smoking diabetes GERD. Currently requiring oxygen  His lab was done which was positive for hyponatremia and hypercalcemia. He is currently on IV fluids. He had a CT angiogram done for shortness of breath and was found to have negative for PE. Also found to have hypercalcemia. CT scan of abdomen and pelvis done which showed lower esophagus thickening suggestive of possible malignancy. A  We are consulted for hyponatremia and hypercalcemia      ROS:     Seen with- no family    positives in bold   Constitutional:  fever, chills, weakness, weight change, fatigue  Skin:  rash, pruritus, hair loss, bruising, dry skin, petechiae  Head, Face, Neck   headaches, swelling,  cervical adenopathy  Respiratory: shortness of breath, cough, or wheezing  Cardiovascular: chest pain, palpitations, dizzy, edema  Gastrointestinal: nausea, vomiting, diarrhea, constipation,belly pain    Yellow skin, blood in stool  Musculoskeletal:  back pain, muscle weakness, gait problems,       joint pain or swelling. Genitourinary:  dysuria, poor urine flow, flank pain, blood in urine  Neurologic:  vertigo, TIA'S, syncope, seizures, focal weakness  Psychosocial:  insomnia, anxiety, or depression. Additional positive findings:                  All other remaining systems are negative or unable to obtain        PMH/PSH/SH/Family History:     Past Medical History:   Diagnosis Date    Cerebral artery occlusion with cerebral infarction ([facility] [address]     Depression     Diabetes mellitus ([facility] [address]     Diverticulitis     Fatty liver     GERD (gastroesophageal reflux disease)     Hyperlipidemia     Hypertension     Osteoarthritis        Past Surgical History:   Procedure Laterality Date    HERNIA REPAIR      LAPAROTOMY  06/14/2018    with dr Pedroza Sensor 6/14/18        reports that he has been smoking cigarettes.  He has a 59.00 pack-year smoking history. He has never used smokeless tobacco. He reports previous alcohol use. He reports that he does not use drugs. family history includes Cancer in his father and mother; Heart Disease in his father.          Medication:     Current Facility-Administered Medications: dextrose 5 % and 0.9 % sodium chloride infusion, , IntraVENous, Continuous  heparin (porcine) injection 5,000 Units, 5,000 Units, SubCUTAneous, 3 times per day  atorvastatin (LIPITOR) tablet 10 mg, 10 mg, Oral, Daily  clopidogrel (PLAVIX) tablet 75 mg, 75 mg, Oral, Daily  fenofibrate (TRIGLIDE) tablet 160 mg, 160 mg, Oral, Daily  pantoprazole (PROTONIX) tablet 40 mg, 40 mg, Oral, QAM AC  docusate sodium (COLACE) capsule 200 mg, 200 mg, Oral, BID  sodium chloride flush 0.9 % injection 10 mL, 10 mL, IntraVENous, 2 times per day  sodium chloride flush 0.9 % injection 10 mL, 10 mL, IntraVENous, PRN  0.9 % sodium chloride infusion, 25 mL, IntraVENous, PRN  potassium chloride 10 mEq/100 mL IVPB (Peripheral Line), 10 mEq, IntraVENous, PRN  magnesium sulfate 2000 mg in 50 mL IVPB premix, 2,000 mg, IntraVENous, PRN  promethazine (PHENERGAN) tablet 12.5 mg, 12.5 mg, Oral, Q6H PRN **OR** ondansetron (ZOFRAN) injection 4 mg, 4 mg, IntraVENous, Q6H PRN  acetaminophen (TYLENOL) tablet 650 mg, 650 mg, Oral, Q6H PRN **OR** acetaminophen (TYLENOL) suppository 650 mg, 650 mg, Rectal, Q6H PRN       Vitals :     Vitals:    10/04/21 0832   BP: (!) 148/79   Pulse: 65   Resp: 16   Temp: 97.9 °F (36.6 °C)   SpO2: 98%       I & O :       Intake/Output Summary (Last 24 hours) at 10/4/2021 0940  Last data filed at 10/4/2021 0932  Gross per 24 hour   Intake 1100 ml   Output 850 ml   Net 250 ml        Physical Examination :     General appearance: Anxious- no, distressed- no, in good spirits- no ,lethargy   HEENT: Lips- normal, teeth- ok , oral mucosa- moist  Neck : Mass- no, appears symmetrical, JVD- not visible  Respiratory: Respiratory effort-  normal, wheeze- no, crackles - no  Cardiovascular:  Ausculation- No M/R/G, Edema no  Abdomen: visible mass- no, distention- no, scar- no, tenderness- no                            hepatosplenomegaly-  no  Musculoskeletal:  clubbing no,cyanosis- no , digital ischemia- no                           muscle strength- grossly normal , tone - grossly normal  Skin: rashes- no , ulcers- no, induration- no, tightening - no  Psychiatric:  Judgement and insight- normal           AAO X 3  Additional finding: -      LABS:     Recent Labs     10/03/21  2118   WBC 5.7   HGB 12.7*   HCT 35.9*        Recent Labs     10/03/21  2118 10/04/21  0200   * 126*   K 3.7 3.7   CL 90* 93*   CO2 27 25   BUN 21* 18   CREATININE 0.8 0.6*   GLUCOSE 319* 78

## 2021-10-04 NOTE — H&P
e      Hospital Medicine History & Physical      PCP: Deepti Zimmerman, APRN - CNP    Date of Admission: 10/3/2021    Date of Service: Pt seen/examined on 10/04/2021  Pt seen/examined face to face on and admitted as inpatient with expected LOS greater than two midnights due to medical therapy      Chief Complaint:    Chief Complaint   Patient presents with    Fatigue     Pt c/o generalized weakness for the past six weeks. Caregiver called EMS today. Pt denies pain.  Hypoglycemia     BG 49 during triage. Amp of D50 given. History Of Present Illness:      78 y.o. male who presented to [facility] with past medical history of depression, hypertension, hyperlipidemia, osteoarthritis, Rodriguez, type 2 diabetes, GERD presented to the ED with chief complaint of fatigue. Patient reported fatigue has been going on for over a month progressively worsening does have caregivers at home. Patient reports he lives alone does require assistance. Patient also reports that he has chronic vertigo and dizziness however he does take meclizine. Patient reports that he has been progressively getting worse and not feeling well and has been having constipation as well. With decreased appetite. Patient denied ever having an EGD done nor prior history of cancer. CODE STATUS discussed and the patient proceeded with full code. Past Medical History:          Diagnosis Date    Cerebral artery occlusion with cerebral infarction ([address]     Depression     Diabetes mellitus ([address]     Diverticulitis     Fatty liver     GERD (gastroesophageal reflux disease)     Hyperlipidemia     Hypertension     Osteoarthritis        Past Surgical History:          Procedure Laterality Date    HERNIA REPAIR      LAPAROTOMY  06/14/2018    with dr Enrique Torres 6/14/18       Medications Prior to Admission:      Prior to Admission medications    Medication Sig Start Date End Date Taking?  Authorizing Provider   docusate sodium (COLACE) 100 MG capsule  9/30/21  Yes Historical Provider, MD   clopidogrel (PLAVIX) 75 MG tablet TAKE 1 TABLET ONCE DAILY 7/29/20  Yes SUDHIR Alonso CNP   amLODIPine (NORVASC) 5 MG tablet TAKE 1 TABLET BY MOUTH EVERY DAY 7/29/20  Yes SUDHIR Alonso CNP   fenofibrate (TRICOR) 145 MG tablet TAKE 1 TABLET BY MOUTH EVERY DAY 7/29/20  Yes SUDHIR Alonso CNP   lisinopril (PRINIVIL;ZESTRIL) 30 MG tablet TALE 1 TABLET BY MOUTH EVERY DAY 7/29/20  Yes SUDHIR Alonso CNP   TRADJENTA 5 MG tablet TAKE 1 TABLET BY MOUTH EVERY DAY 7/29/20  Yes SUDHIR Alonso CNP   BYSTOLIC 10 MG tablet TAKE 1 TABLET BY MOUTH EVERY DAY 7/29/20  Yes SUDHIR Alonso CNP   atorvastatin (LIPITOR) 10 MG tablet TAKE 1 TABLET BY MOUTH EVERY DAY 7/29/20  Yes SUDHIR Alonso CNP   glipiZIDE (GLUCOTROL) 5 MG tablet TAKE ONE TABLET BY MOUTH DAILY 7/29/20  Yes SUDHIR Alonso CNP   esomeprazole ([address]) 40 MG delayed release capsule TAKE 1 CAPSULE ONCE DAILY 7/29/20  Yes SUDHIR Alonso CNP   vitamin D (ERGOCALCIFEROL) 1.25 MG (39936 UT) CAPS capsule TAKE 1 CAPSULE ONCE A WEEK 5/29/20  Yes SUDHIR Alonso CNP   esomeprazole Magnesium (NEXIUM) 40 MG PACK Take 1 packet by mouth daily 5/29/20  Yes SUDHIR Alonso CNP   meclizine (ANTIVERT) 25 MG CHEW  9/30/21   Historical Provider, MD       Allergies:  Rosuvastatin    Social History:      TOBACCO:   reports that he has been smoking cigarettes. He has a 59.00 pack-year smoking history. He has never used smokeless tobacco.  ETOH:   reports previous alcohol use.   E-Cigarettes/Vaping Use     Questions Responses    E-Cigarette/Vaping Use Never User    Start Date     Passive Exposure     Quit Date     Counseling Given     Comments             Family History:      Reviewed in detail and noncontributory        Problem Relation Age of Onset    Cancer Mother     Heart Disease Father     Cancer Father        REVIEW OF SYSTEMS:     Constitutional:  No Fever, No Chills, No Night Sweats  ENT/Mouth:  No Nasal Congestion,  No Hoarseness, No new mouth lesion  Eyes:  No Eye Pain, No Redness, No Discharge  Cardiovascular:  No Chest Pain, No Orthopnea, No Palpitations  Respiratory:  No Cough, No Sputum, No Dyspnea  Gastrointestinal: No Vomiting, No Diarrhea, No abdominal pain  Genitourinary: No Urinary Frequency, No Hematuria, No Urinary pain  Musculoskeletal:  No worsening Arthralgias, No worsening Myalgias  Skin:  No new Skin Lesions, No new skin rash  Neuro: + Weakness, No new numbness. Psych:  No suicial ideation, No Violence ideation    PHYSICAL EXAM PERFORMED:    BP (!) 143/70   Pulse 76   Temp 98.1 °F (36.7 °C) (Oral)   Resp 19   Ht 5' 8\" (1.727 m)   Wt 180 lb (81.6 kg)   SpO2 98%   BMI 27.37 kg/m²     General appearance:  mild acute distress, appears older than stated age  HEENT:   atraumatic, sclera anicteric, Conjunctivae clear. Neck: Supple,Trachea midline, no goiter  Respiratory:minimal accessory muscle usage, Normal respiratory effort. Basilar crackle bilaterally Cardiovascular:  Regular rate and rhythm, capillary refill 2 seconds  Abdomen: Soft, non-tender, non-distended with normal bowel sounds. Musculoskeletal:  No clubbing, cyanosis. trace edema LE bilaterally. Skin: turgor normal.  No new rashes or lesions. Neurologic: Alert and oriented x4, no new focal sensory/motor deficits. Labs:     Recent Labs     10/03/21  2118   WBC 5.7   HGB 12.7*   HCT 35.9*        Recent Labs     10/03/21  2118 10/04/21  0200   * 126*   K 3.7 3.7   CL 90* 93*   CO2 27 25   BUN 21* 18   CREATININE 0.8 0.6*   CALCIUM 12.5* 12.1*     Recent Labs     10/03/21  2118   AST 16   ALT 12   BILITOT 0.9   ALKPHOS 42     No results for input(s): INR in the last 72 hours.   Recent Labs     10/03/21  2118 10/03/21  2348   TROPONINI 0.02* <0.01       Urinalysis:      Lab Results   Component Value Date    NITRU Negative 10/03/2021    BLOODU Negative 10/03/2021    SPECGRAV 1.025 10/03/2021    GLUCOSEU 100 10/03/2021       Radiology:     CXR: I have reviewed the CXR with the following interpretation:   Mild central pulmonary congestion is groundglass opacities  EKG:  I have reviewed the EKG with the following interpretation:   Normal sinus rhythm QTc 429    CT CHEST PULMONARY EMBOLISM W CONTRAST   Final Result   No PE identified. Masslike thickening and edema of the lower esophagus worrisome for potential   malignancy and/or esophagitis. Diffuse intra and interlobular septal thickening of the lungs with poorly   defined airspace opacity within the left lung base and bilateral pleural   effusions. Findings could reflect pulmonary edema although the possibility   of lymphangitic carcinomatosis needs to be excluded. Nonspecific small hypoattenuating lesions within the liver and mild upper   abdominal lymphadenopathy. Metastatic disease not excluded. Small focal hypodensity along the inferior pole of the spleen could also   reflect a metastatic implant or small infarct. Diverticulosis coli. Atherosclerotic disease resulting in severe narrowing of the left common   iliac artery. CT ABDOMEN PELVIS W IV CONTRAST Additional Contrast? None   Final Result   No PE identified. Masslike thickening and edema of the lower esophagus worrisome for potential   malignancy and/or esophagitis. Diffuse intra and interlobular septal thickening of the lungs with poorly   defined airspace opacity within the left lung base and bilateral pleural   effusions. Findings could reflect pulmonary edema although the possibility   of lymphangitic carcinomatosis needs to be excluded. Nonspecific small hypoattenuating lesions within the liver and mild upper   abdominal lymphadenopathy. Metastatic disease not excluded. Small focal hypodensity along the inferior pole of the spleen could also   reflect a metastatic implant or small infarct. Diverticulosis coli. Atherosclerotic disease resulting in severe narrowing of the left common   iliac artery. XR CHEST PORTABLE   Final Result   Mild left ventricular enlargement with mild central pulmonary congestion or   pulmonary artery hypertension      Hazy interstitial ground-glass opacities throughout both lungs which is most   prominent in the lung bases and could be due to pulmonary edema and/or   multisegmental bronchopneumonia.              ASSESSMENT AND PLAN:    Active Hospital Problems    Diagnosis Date Noted    Hyponatremia [E87.1] 10/04/2021     Acute hypoxic respiratory failure,  Baseline on March the pending currently requiring 2 L reported desatted to 85% in room air in the ED  CTA negative for PE  Noted pulmonary congestion  Echocardiogram to check for cardiac cause    Cancer: Suspected gastric cancer  GI consulted for assistance, much appreciated  Oncology consulted for assistance, much appreciated    Acute hyponatremia:  Nephrology consulted for assistance, much appreciated    Hypercalcemia: Suspected malignancy  PTH ordered  Zoledronic acid given  IVF and Lasix    Left common iliac: Severe narrowing evident on CT  No limbic ischemia at this time  Defer to primary    Hypoglycemia: On D5 normal saline    Type 2 diabetes: Held glycemic meds    Chronic medical conditions:  Hyperlipidemia: Continue home medication  Essential Hypertension: Held home medication  GERD: Continue home medication  Constipation: Docusate    Diet: NPO except meds ordered    DVT Prophylaxis: Heparin    Dispo:   Expected LOS greater than two [facility]

## 2021-10-04 NOTE — ED NOTES
Telephone report given to Catrachito Moe RN at this time. Abdulaziz Isaac RN to watch pt until C4 RN arrives.      Jeannie Mcardle, RN  10/04/21 1368

## 2021-10-04 NOTE — PROGRESS NOTES
HOSPITAL MEDICINE  - BRIEF NOTE    Seen by my colleague overnight. History/record reviewed, discussed with care team.    78 y.o. male who presented to [facility] with past medical history of depression, hypertension, hyperlipidemia, osteoarthritis, Rodriguez, type 2 diabetes, GERD presented to the ED with chief complaint of fatigue. Patient reported fatigue has been going on for over a month progressively worsening does have caregivers at home. Patient reports he lives alone does require assistance. Patient also reports that he has chronic vertigo and dizziness however he does take meclizine. Patient reports that he has been progressively getting worse and not feeling well and has been having constipation as well. With decreased appetite. Patient denied ever having an EGD done nor prior history of cancer. CODE STATUS discussed and the patient proceeded with full code. Assessment/Plan:    Acute hypoxic respiratory failure,  CTA negative for PE  Noted pulmonary congestion vs lymphangitic spread  Echocardiogram to check for cardiac cause  Rule out COVID; rapid negative, PCR pending    Esophageal Mass, rule out Esophageal CA  Imaging raises suspicion for pulmonary edema vs lymphangitic spread, possible liver and splenic metastasis. GI consulted for assistance  Oncology consulted for assistance    Acute hyponatremia:  Nephrology consulted for assistance, much appreciated    Hypercalcemia: Suspected malignancy  PTH ordered  Zoledronic acid given  IVF and Lasix    Left common iliac: Severe narrowing evident on CT  Asymptomatic    Diabetes Mellitus, Type 2: Controlled. Hold home regimen. Monitor blood sugar and adjust regimen as needed. Diabetic (Carb-controlled) diet when able.       Marysol Alvarado MD

## 2021-10-04 NOTE — ED NOTES
Bed: 18  Expected date:   Expected time:   Means of arrival:   Comments:  SHELIA Ruff 98, RN  10/03/21 2101

## 2021-10-04 NOTE — CARE COORDINATION
CASE MANAGEMENT INITIAL ASSESSMENT      Reviewed chart and completed assessment via telephone with:  Explained Case Management role/services. to patient    Primary contact information:see below    Health Care Decision Maker :   Primary Decision Maker: Olive Alberto - Scotty - [phone]          Can this person be reached and be able to respond quickly, such as within a few minutes or hours? Yes      Admit date/status:Inpatient 10/3/2021  Diagnosis:Hypercalcemia  Is this a Readmission?:  No      Insurance:Medicare    Precert required for SNF: No       3 night stay required: No waived due to pandemic    Living arrangements, Adls, care needs, prior to admission:Lives home alone, states he was IPTA with adl's    Transportation:TBD    Durable Medical Equipment at home:  Walker__Cane__RTS__ BSC__Shower Chair__  02__ HHN__ CPAP__  BiPap__  Hospital Bed__ W/C___ Other__________    Services in the home and/or outpatient, prior to [place] [id number] for light house keeping and errands      PT/OT recs:Not seen yet this admission    Hospital Exemption Notification (HEN):N/A    Barriers to discharge:None identified    Plan/comments: To return home if possible. He is open to skilled home care and SNF if needed. Will follow.     ECOC on chart for MD signature

## 2021-10-04 NOTE — PROGRESS NOTES
4 Eyes Skin Assessment     The patient is being assess for   Admission    I agree that 2 RN's have performed a thorough Head to Toe Skin Assessment on the patient. ALL assessment sites listed below have been assessed. Areas assessed for pressure by both nurses:   [x]   Head, Face, and Ears   [x]   Shoulders, Back, and Chest, Abdomen  [x]   Arms, Elbows, and Hands   [x]   Coccyx, Sacrum, and Ischium  [x]   Legs, Feet, and Heels        Skin Assessed Under all Medical Devices by both nurses:                All Mepilex Borders were peeled back and area peeked at by both nurses:    Please list where Mepilex Borders are located: none             **SHARE this note so that the co-signing nurse is able to place an eSignature**    Co-signer eSignature:     Does the Patient have Skin Breakdown related to pressure?   No     (Insert Photo here)         Mateusz Prevention initiated:  Yes   Wound Care Orders initiated:  No      WOC nurse consulted for Pressure Injury (Stage 3,4, Unstageable, DTI, NWPT, Complex wounds)and New or Established Ostomies:  No      Primary Nurse eSignature: Miranda Pike RN

## 2021-10-05 ENCOUNTER — APPOINTMENT (OUTPATIENT)
Dept: VASCULAR LAB | Age: 76
DRG: 374 | End: 2021-10-05
Payer: MEDICARE

## 2021-10-05 LAB
ALBUMIN SERPL-MCNC: 3.4 G/DL (ref 3.4–5)
ANION GAP SERPL CALCULATED.3IONS-SCNC: 9 MMOL/L (ref 3–16)
ANION GAP SERPL CALCULATED.3IONS-SCNC: 9 MMOL/L (ref 3–16)
BASOPHILS ABSOLUTE: 0 K/UL (ref 0–0.2)
BASOPHILS RELATIVE PERCENT: 0.4 %
BUN BLDV-MCNC: 15 MG/DL (ref 7–20)
BUN BLDV-MCNC: 16 MG/DL (ref 7–20)
CALCIUM IONIZED: 1.3 MMOL/L (ref 1.12–1.32)
CALCIUM SERPL-MCNC: 11.6 MG/DL (ref 8.3–10.6)
CALCIUM SERPL-MCNC: 11.8 MG/DL (ref 8.3–10.6)
CHLORIDE BLD-SCNC: 96 MMOL/L (ref 99–110)
CHLORIDE BLD-SCNC: 98 MMOL/L (ref 99–110)
CO2: 24 MMOL/L (ref 21–32)
CO2: 27 MMOL/L (ref 21–32)
CREAT SERPL-MCNC: 0.6 MG/DL (ref 0.8–1.3)
CREAT SERPL-MCNC: 0.6 MG/DL (ref 0.8–1.3)
EOSINOPHILS ABSOLUTE: 0 K/UL (ref 0–0.6)
EOSINOPHILS RELATIVE PERCENT: 0.9 %
GFR AFRICAN AMERICAN: >60
GFR AFRICAN AMERICAN: >60
GFR NON-AFRICAN AMERICAN: >60
GFR NON-AFRICAN AMERICAN: >60
GLUCOSE BLD-MCNC: 149 MG/DL (ref 70–99)
GLUCOSE BLD-MCNC: 150 MG/DL (ref 70–99)
GLUCOSE BLD-MCNC: 154 MG/DL (ref 70–99)
GLUCOSE BLD-MCNC: 170 MG/DL (ref 70–99)
GLUCOSE BLD-MCNC: 269 MG/DL (ref 70–99)
HCT VFR BLD CALC: 39 % (ref 40.5–52.5)
HEMOGLOBIN: 13.7 G/DL (ref 13.5–17.5)
LV EF: 55 %
LVEF MODALITY: NORMAL
LYMPHOCYTES ABSOLUTE: 0.2 K/UL (ref 1–5.1)
LYMPHOCYTES RELATIVE PERCENT: 3.9 %
MAGNESIUM: 1.1 MG/DL (ref 1.8–2.4)
MCH RBC QN AUTO: 31.8 PG (ref 26–34)
MCHC RBC AUTO-ENTMCNC: 35.1 G/DL (ref 31–36)
MCV RBC AUTO: 90.6 FL (ref 80–100)
MONOCYTES ABSOLUTE: 0.4 K/UL (ref 0–1.3)
MONOCYTES RELATIVE PERCENT: 6.6 %
NEUTROPHILS ABSOLUTE: 5 K/UL (ref 1.7–7.7)
NEUTROPHILS RELATIVE PERCENT: 88.2 %
PARATHYROID HORMONE INTACT: 5.2 PG/ML (ref 14–72)
PDW BLD-RTO: 13 % (ref 12.4–15.4)
PERFORMED ON: ABNORMAL
PH VENOUS: 7.43 (ref 7.35–7.45)
PHOSPHORUS: 2.2 MG/DL (ref 2.5–4.9)
PLATELET # BLD: 197 K/UL (ref 135–450)
PMV BLD AUTO: 7.6 FL (ref 5–10.5)
POTASSIUM REFLEX MAGNESIUM: 4 MMOL/L (ref 3.5–5.1)
POTASSIUM SERPL-SCNC: 4.5 MMOL/L (ref 3.5–5.1)
RBC # BLD: 4.31 M/UL (ref 4.2–5.9)
SODIUM BLD-SCNC: 131 MMOL/L (ref 136–145)
SODIUM BLD-SCNC: 132 MMOL/L (ref 136–145)
SOLUBLE TRANSFERRIN RECEPT: 2.5 MG/L (ref 2.2–5)
WBC # BLD: 5.6 K/UL (ref 4–11)

## 2021-10-05 PROCEDURE — 6370000000 HC RX 637 (ALT 250 FOR IP): Performed by: INTERNAL MEDICINE

## 2021-10-05 PROCEDURE — 93970 EXTREMITY STUDY: CPT

## 2021-10-05 PROCEDURE — 2580000003 HC RX 258: Performed by: NURSE PRACTITIONER

## 2021-10-05 PROCEDURE — 2060000000 HC ICU INTERMEDIATE R&B

## 2021-10-05 PROCEDURE — 2580000003 HC RX 258: Performed by: INTERNAL MEDICINE

## 2021-10-05 PROCEDURE — 6360000002 HC RX W HCPCS: Performed by: INTERNAL MEDICINE

## 2021-10-05 PROCEDURE — 83735 ASSAY OF MAGNESIUM: CPT

## 2021-10-05 PROCEDURE — 82330 ASSAY OF CALCIUM: CPT

## 2021-10-05 PROCEDURE — 93306 TTE W/DOPPLER COMPLETE: CPT

## 2021-10-05 PROCEDURE — 80069 RENAL FUNCTION PANEL: CPT

## 2021-10-05 PROCEDURE — 85025 COMPLETE CBC W/AUTO DIFF WBC: CPT

## 2021-10-05 PROCEDURE — 2700000000 HC OXYGEN THERAPY PER DAY

## 2021-10-05 PROCEDURE — 94761 N-INVAS EAR/PLS OXIMETRY MLT: CPT

## 2021-10-05 PROCEDURE — 36415 COLL VENOUS BLD VENIPUNCTURE: CPT

## 2021-10-05 RX ORDER — SODIUM CHLORIDE 9 MG/ML
INJECTION, SOLUTION INTRAVENOUS CONTINUOUS
Status: DISCONTINUED | OUTPATIENT
Start: 2021-10-05 | End: 2021-10-06

## 2021-10-05 RX ADMIN — DEXTROSE AND SODIUM CHLORIDE: 5; 900 INJECTION, SOLUTION INTRAVENOUS at 06:51

## 2021-10-05 RX ADMIN — HEPARIN SODIUM 5000 UNITS: 5000 INJECTION INTRAVENOUS; SUBCUTANEOUS at 06:11

## 2021-10-05 RX ADMIN — FENOFIBRATE 160 MG: 160 TABLET ORAL at 07:56

## 2021-10-05 RX ADMIN — CLOPIDOGREL BISULFATE 75 MG: 75 TABLET, FILM COATED ORAL at 07:56

## 2021-10-05 RX ADMIN — PANTOPRAZOLE SODIUM 40 MG: 40 TABLET, DELAYED RELEASE ORAL at 07:56

## 2021-10-05 RX ADMIN — SODIUM CHLORIDE, PRESERVATIVE FREE 10 ML: 5 INJECTION INTRAVENOUS at 19:54

## 2021-10-05 RX ADMIN — ATORVASTATIN CALCIUM 10 MG: 10 TABLET, FILM COATED ORAL at 07:57

## 2021-10-05 RX ADMIN — MAGNESIUM SULFATE HEPTAHYDRATE 2000 MG: 40 INJECTION, SOLUTION INTRAVENOUS at 06:59

## 2021-10-05 RX ADMIN — HEPARIN SODIUM 5000 UNITS: 5000 INJECTION INTRAVENOUS; SUBCUTANEOUS at 13:29

## 2021-10-05 RX ADMIN — DIBASIC SODIUM PHOSPHATE, MONOBASIC POTASSIUM PHOSPHATE AND MONOBASIC SODIUM PHOSPHATE 1 TABLET: 852; 155; 130 TABLET ORAL at 13:34

## 2021-10-05 RX ADMIN — HEPARIN SODIUM 5000 UNITS: 5000 INJECTION INTRAVENOUS; SUBCUTANEOUS at 22:10

## 2021-10-05 RX ADMIN — MAGNESIUM SULFATE HEPTAHYDRATE 2000 MG: 40 INJECTION, SOLUTION INTRAVENOUS at 13:34

## 2021-10-05 RX ADMIN — SODIUM CHLORIDE: 9 INJECTION, SOLUTION INTRAVENOUS at 18:10

## 2021-10-05 RX ADMIN — DOCUSATE SODIUM 100 MG: 100 CAPSULE, LIQUID FILLED ORAL at 07:56

## 2021-10-05 ASSESSMENT — PAIN SCALES - GENERAL
PAINLEVEL_OUTOF10: 0
PAINLEVEL_OUTOF10: 0

## 2021-10-05 NOTE — CONSULTS
ONCOLOGY HEMATOLOGY CARE PROGRESS NOTE      SUBJECTIVE:     The patient states he is fatigued. He denies any other complaints this morning. ROS:     Constitutional:  No weight loss, No fever, No chills, No night sweats. Energy level good. Eyes:  No impairment or change in vision  ENT / Mouth:  No pain, abnormal ulceration, bleeding, nasal drip or change in voice or hearing  Cardiovascular:  No chest pain, palpitations, new edema, or calf discomfort  Respiratory:  No pain, hemoptysis, change to breathing  Breast:  No pain, discharge, change in appearance or texture  Gastrointestinal:  30 pound weight loss over the last several months   Urinary:  No pain, bleeding or change in continence  Genitalia: No pain, bleeding or discharge  Musculoskeletal:  No redness, pain, edema or weakness  Skin:  No pruritus, rash, change to nodules or lesions  Neurologic:  No discomfort, change in mental status, speech, sensory or motor activity  Psychiatric:  No change in concentration or change to affect or mood  Endocrine:  No hot flashes, increased thirst, or change to urine production  Hematologic: No petechiae, ecchymosis or bleeding  Lymphatic:  No lymphadenopathy or lymphedema  Allergy / Immunologic:  No eczema, hives, frequent or recurrent infections    OBJECTIVE        Physical    VITALS:  BP (!) 167/83   Pulse 97   Temp 98.5 °F (36.9 °C) (Oral)   Resp 19   Ht 5' 8\" (1.727 m)   Wt 164 lb 3.9 oz (74.5 kg)   SpO2 97%   BMI 24.97 kg/m²   TEMPERATURE:  Current - Temp: 98.5 °F (36.9 °C);  Max - Temp  Av.1 °F (36.7 °C)  Min: 97.6 °F (36.4 °C)  Max: 98.5 °F (36.9 °C)  PULSE OXIMETRY RANGE: SpO2  Av.5 %  Min: 93 %  Max: 97 %  24HR INTAKE/OUTPUT:    Intake/Output Summary (Last 24 hours) at 10/5/2021 0915  Last data filed at 10/5/2021 0849  Gross per 24 hour   Intake 1219.06 ml   Output 1150 ml   Net 69.06 ml       CONSTITUTIONAL: awake, alert, cooperative, no apparent distress   EYES: pupils equal, round and reactive to light, sclera clear and conjunctiva normal  ENT: Normocephalic, without obvious abnormality, atraumatic  NECK: supple, symmetrical, no jugular venous distension and no carotid bruits   HEMATOLOGIC/LYMPHATIC: no cervical, supraclavicular or axillary lymphadenopathy   LUNGS: no increased work of breathing and clear to auscultation   CARDIOVASCULAR: regular rate and rhythm, normal S1 and S2, no murmur noted  ABDOMEN: normal bowel sounds x 4, soft, non-distended, non-tender, no masses palpated, no hepatosplenomgaly   MUSCULOSKELETAL: full range of motion noted, tone is normal  NEUROLOGIC: awake, alert, oriented to name, place and time. Motor skills grossly intact. SKIN: Normal skin color, texture, turgor and no jaundice.  appears intact   EXTREMITIES: no LE edema       Data      Recent Labs     10/03/21  2118 10/05/21  0525   WBC 5.7 5.6   HGB 12.7* 13.7   HCT 35.9* 39.0*    197   MCV 89.7 90.6        Recent Labs     10/04/21  1834 10/05/21  0007 10/05/21  0525   * 132* 131*   K 3.7 4.0 4.5   CL 92* 96* 98*   CO2 26 27 24   PHOS  --   --  2.2*   BUN 15 16 15   CREATININE 0.6* 0.6* 0.6*     Recent Labs     10/03/21  2118   AST 16   ALT 12   BILITOT 0.9   ALKPHOS 42       Magnesium:    Lab Results   Component Value Date    MG 1.10 10/05/2021         Problem List  Patient Active Problem List   Diagnosis    Degeneration of cervical intervertebral disc    Degeneration of lumbar or lumbosacral intervertebral disc    Lumbar radiculopathy    Cervical radiculopathy, chronic    Brain TIA    Type 2 diabetes mellitus without complication, without long-term current use of insulin (HCC)    Mixed hyperlipidemia    Essential hypertension    Gastroesophageal reflux disease without esophagitis    Cerebrovascular accident (CVA) ([address]    Iron deficiency anemia secondary to inadequate dietary iron intake    Colonoscopy refused    Hyponatremia       ASSESSMENT AND PLAN:    Possible GI malignancy:  -Endoscopy will be scheduled  -He has had a 30 pound weight loss  -No further intervention until we have a biopsy    Hyponatremia:  -This is most commonly seen in small cell lung carcinoma if it is an endocrinology finding  -If is just from dehydration, this may be a red herring    Hypercalcemia:  -Most commonly seen in squamous cell carcinoma the lung, but can be seen in other squamous cell carcinomas              ONCOLOGIC DISPOSITION:   -We are waiting on the biopsy    Shannon Mccallum MD  Please contact through [address]

## 2021-10-05 NOTE — PROGRESS NOTES
[facility] NEPHROLOGY    [facility]              [phone]                         Interval History and plan:      Sodium is coming up  Calcium is coming down  PO4 low  Magnesium is low  Plan:    Already got Zometa  Unable to go up on IV fluids due to oxygen requirement  If oxygen requirement goes up will need to discontinue IV fluids  Will take several days for Zometa to bring calcium down to normal  zometa is associated with multiple electrolyte abnormality including hypomagnesemia and hypophosphatemia should monitor daily for now  Getting work-up for malignancy and noted oncology has been consulted    Low PO4 and low mg  Replace    Ok to check labs daily now                     Assessment :     Hyponatremia  Clinically hypovolemic on presentation  Sodium 124 on admission-coming up to 126 on consultation  Urine sodium 32    He has possible bowel cancer with metastasis  Urine sodium is consistent with hypovolemic but he may also have SIADH component      Hypercalcemia  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 intake-CT abdomen suggestive of possible lower esophagus malignancy, CT scan of the lungs showed septal thickening lymphangitic carcinomatosis versus pulmonary edema  Also nonspecific small hypoattenuating lesions within the liver and mild upper abdominal lymphadenopathy  Possible splenic metastasis      [facility] Nephrology would like to thank Disha Acosta MD   for opportunity to serve this patient      Please call with questions at-   24 Hrs Answering service [phone] or  7 am- 5 pm via Perfect serve or cell phone  Abbi Hernandez MD          CC/reason for consult :     Hyponatremia/hypercalcemia     HPI :     David Floyd is a 78 y.o. male presented to   the hospital on 10/3/2021 with fatigue.   He has been eating and drinking very poorly for last several days. Has fatigue for more than a month. Also has dizziness, constipated. Has significant loss of weight. History of smoking diabetes GERD. Currently requiring oxygen  His lab was done which was positive for hyponatremia and hypercalcemia. He is currently on IV fluids. He had a CT angiogram done for shortness of breath and was found to have negative for PE. Also found to have hypercalcemia. CT scan of abdomen and pelvis done which showed lower esophagus thickening suggestive of possible malignancy. A  We are consulted for hyponatremia and hypercalcemia      ROS:     Seen with- no family    positives in bold   Constitutional:  fever, chills, weakness, weight change, fatigue  Skin:  rash, pruritus, hair loss, bruising, dry skin, petechiae  Head, Face, Neck   headaches, swelling,  cervical adenopathy  Respiratory: shortness of breath, cough, or wheezing  Cardiovascular: chest pain, palpitations, dizzy, edema  Gastrointestinal: nausea, vomiting, diarrhea, constipation,belly pain    Yellow skin, blood in stool  Musculoskeletal:  back pain, muscle weakness, gait problems,       joint pain or swelling. Genitourinary:  dysuria, poor urine flow, flank pain, blood in urine  Neurologic:  vertigo, TIA'S, syncope, seizures, focal weakness  Psychosocial:  insomnia, anxiety, or depression. Additional positive findings:                  All other remaining systems are negative or unable to obtain        PMH/PSH/SH/Family History:     Past Medical History:   Diagnosis Date    [facility]bral artery occlusion with [facility] infarction [facility]     Depression     Diabetes mellitus ([facility] [facility]     Diverticulitis     Fatty liver     GERD (gastroesophageal reflux disease)     Hyperlipidemia     Hypertension     Osteoarthritis        Past Surgical History:   Procedure Laterality Date    HERNIA REPAIR      LAPAROTOMY  06/14/2018    with dr Jaime Coughlin 6/14/18        reports that he has been smoking cigarettes. He has a 59.00 pack-year smoking history. He has never used smokeless tobacco. He reports previous alcohol use. He reports that he does not use drugs. family history includes Cancer in his father and mother; Heart Disease in his father.          Medication:     Current Facility-Administered Medications: dextrose 5 % and 0.9 % sodium chloride infusion, , IntraVENous, Continuous  heparin (porcine) injection 5,000 Units, 5,000 Units, SubCUTAneous, 3 times per day  atorvastatin (LIPITOR) tablet 10 mg, 10 mg, Oral, Daily  clopidogrel (PLAVIX) tablet 75 mg, 75 mg, Oral, Daily  fenofibrate (TRIGLIDE) tablet 160 mg, 160 mg, Oral, Daily  pantoprazole (PROTONIX) tablet 40 mg, 40 mg, Oral, QAM AC  sodium chloride flush 0.9 % injection 10 mL, 10 mL, IntraVENous, 2 times per day  sodium chloride flush 0.9 % injection 10 mL, 10 mL, IntraVENous, PRN  0.9 % sodium chloride infusion, 25 mL, IntraVENous, PRN  potassium chloride 10 mEq/100 mL IVPB (Peripheral Line), 10 mEq, IntraVENous, PRN  magnesium sulfate 2000 mg in 50 mL IVPB premix, 2,000 mg, IntraVENous, PRN  promethazine (PHENERGAN) tablet 12.5 mg, 12.5 mg, Oral, Q6H PRN **OR** ondansetron (ZOFRAN) injection 4 mg, 4 mg, IntraVENous, Q6H PRN  acetaminophen (TYLENOL) tablet 650 mg, 650 mg, Oral, Q6H PRN **OR** acetaminophen (TYLENOL) suppository 650 mg, 650 mg, Rectal, Q6H PRN  docusate sodium (COLACE) capsule 100 mg, 100 mg, Oral, Daily       Vitals :     Vitals:    10/05/21 0731   BP: (!) 167/83   Pulse: 97   Resp: 19   Temp: 98.5 °F (36.9 °C)   SpO2: 97%       I & O :       Intake/Output Summary (Last 24 hours) at 10/5/2021 1129  Last data filed at 10/5/2021 0849  Gross per 24 hour   Intake 1219.06 ml   Output 900 ml   Net 319.06 ml        Physical Examination :     General appearance: Anxious- no, distressed- no, in good spirits- no ,lethargy   HEENT: Lips- normal, teeth- ok , oral mucosa- moist  Neck : Mass- no, appears symmetrical, JVD- not visible  Respiratory: Respiratory effort-  normal, wheeze- no, crackles - no  Cardiovascular:  Ausculation- No M/R/G, Edema no  Abdomen: visible mass- no, distention- no, scar- no, tenderness- no                            hepatosplenomegaly-  no  Musculoskeletal:  clubbing no,cyanosis- no , digital ischemia- no                           muscle strength- grossly normal , tone - grossly normal  Skin: rashes- no , ulcers- no, induration- no, tightening - no  Psychiatric:  Judgement and insight- normal           AAO X 3  Additional finding: -      LABS:     Recent Labs     10/03/21  2118 10/05/21  0525   WBC 5.7 5.6   HGB 12.7* 13.7   HCT 35.9* 39.0*    197     Recent Labs     10/04/21  1834 10/05/21  0007 10/05/21  0525   * 132* 131*   K 3.7 4.0 4.5   CL 92* 96* 98*   CO2 26 27 24   BUN 15 16 15   CREATININE 0.6* 0.6* 0.6*   GLUCOSE 131* 154* 150*   MG  --   --  1.10*   PHOS  --   --  2.2*

## 2021-10-05 NOTE — PROGRESS NOTES
Hospitalist Progress Note    Date of Admission: 10/3/2021    Chief Complaint: Fatigue    Hospital Course:                         78 y.o. male who presented to [facility] with past medical history of depression, hypertension, hyperlipidemia, osteoarthritis, [place], type 2 diabetes, GERD presented to the ED with chief complaint of fatigue. Patient reported fatigue has been going on for over a month progressively worsening does have caregivers at home. Patient reports he lives alone does require assistance. Patient also reports that he has chronic vertigo and dizziness however he does take meclizine. Patient reports that he has been progressively getting worse and not feeling well and has been having constipation as well. With decreased appetite. Patient denied ever having an EGD done nor prior history of cancer. CODE STATUS discussed and the patient proceeded with full code. Subjective: Noted to have infiltrated PIV LUE and IV removed. Placed on right but also developed some edema. Bilateral UE Doppler revealed an acute SVT of LUE cephalic vein, likely due to recent PIV. He reports improvement in SOB and is only on minimal O2 support. No abdominal pain or nausea. He reports a long standing history of GERD symptoms but does not recall ever having an EGD. Labs:   Recent Labs     10/03/21  2118 10/05/21  0525   WBC 5.7 5.6   HGB 12.7* 13.7   HCT 35.9* 39.0*    197     Recent Labs     10/04/21  1834 10/05/21  0007 10/05/21  0525   * 132* 131*   K 3.7 4.0 4.5   CL 92* 96* 98*   CO2 26 27 24   BUN 15 16 15   CREATININE 0.6* 0.6* 0.6*   CALCIUM 12.0* 11.8* 11.6*   PHOS  --   --  2.2*     Recent Labs     10/03/21  2118   AST 16   ALT 12   BILITOT 0.9   ALKPHOS 42     No results for input(s): INR in the last 72 hours.     Physical Exam Performed:    BP (!) 167/83   Pulse 97   Temp 98.5 °F (36.9 °C) (Oral)   Resp 19   Ht 5' 8\" (1.727 m)   Wt 164 lb 3.9 oz (74.5 kg)   SpO2 97%   BMI 24.97 kg/m²     General appearance: No apparent distress, appears stated age and cooperative. HEENT: Pupils equal, round, and reactive to light. Conjunctivae/corneas clear. Neck: Supple, no jugular venous distention. Trachea midline with full range of motion. Respiratory:  Normal respiratory effort. Clear to auscultation, bilaterally without Rales/Wheezes/Rhonchi. Cardiovascular: Regular rate and rhythm with normal S1/S2 without murmurs, rubs or gallops. Abdomen: Soft, non-tender, non-distended with normal bowel sounds. Musculoskelatal: No clubbing, cyanosis. LUE swelling with focal erythema over AC. RUE mild swelling. Full range of motion without deformity. Neurologic:  Neurovascularly intact without any focal sensory/motor deficits. Cranial nerves: II-XII intact, grossly non-focal.  Psychiatric: Alert and oriented, thought content appropriate, normal insight  Skin: Skin color, texture, turgor normal.  No rashes or lesions. Capillary Refill: Brisk,< 3 seconds   Peripheral Pulses: +2 palpable, equal bilaterally       Assessment/Plan:    Active Hospital Problems    Diagnosis     Hyponatremia [E87.1]      Acute hypoxic respiratory failure  CTA negative for PE  Noted pulmonary congestion vs lymphangitic spread  Echocardiogram to check for cardiac cause  Ruled out COVID; rapid negative and PCR negative    Esophageal Mass, rule out Esophageal CA  Imaging raises suspicion for pulmonary edema vs lymphangitic spread, possible liver and splenic metastasis. He reports a long standing history of GERD symptoms but does not recall ever having an EGD.   GI consulted for assistance; EGD once electrolytes improved  Oncology consulted for assistance    Acute hyponatremia:  Nephrology following  Improving  Replete electrolytes  Monitor    Hypercalcemia: Suspected malignancy  PTH ordered  Zoledronic acid given  IVF and Lasix    Left common iliac: Severe narrowing evident on CT  Asymptomatic    Diabetes Mellitus, Type 2: Controlled. Hold home regimen. Monitor blood sugar and adjust regimen as needed. Diabetic (Carb-controlled) diet when able.      DVT Prophylaxis: Lovenox  Diet: Diet NPO Exceptions are: Ice Chips, Sips of Water with Meds  Code Status: Full Code  PT/OT Eval Status: NA    Dispo - 2-3 days     Yaa Cooley MD

## 2021-10-05 NOTE — PLAN OF CARE
Problem: Falls - Risk of:  Goal: Will remain free from falls  Description: Will remain free from falls  10/5/2021 0158 by Misbah Boone RN  Outcome: Ongoing     Problem: Skin Integrity:  Goal: Will show no infection signs and symptoms  Description: Will show no infection signs and symptoms  10/5/2021 0158 by Misbah Boone RN  Outcome: Ongoing

## 2021-10-05 NOTE — PLAN OF CARE
Problem: Falls - Risk of:  Goal: Will remain free from falls  Description: Will remain free from falls  10/5/2021 1407 by Cristine Villanueva RN  Outcome: Ongoing  10/5/2021 0158 by Dulce Blanton RN  Outcome: Ongoing  Goal: Absence of physical injury  Description: Absence of physical injury  Outcome: Ongoing     Problem: Skin Integrity:  Goal: Will show no infection signs and symptoms  Description: Will show no infection signs and symptoms  10/5/2021 1407 by Cristine Villanueva RN  Outcome: Ongoing  10/5/2021 0158 by Dulce Blanton RN  Outcome: Ongoing  Goal: Absence of new skin breakdown  Description: Absence of new skin breakdown  Outcome: Ongoing

## 2021-10-05 NOTE — PROGRESS NOTES
Last Mg is still low. Last ionized Ca was still high. I am wondering if treatment will get these two electrolytes in normal range shortly. Once that happens I can plan an EGD.

## 2021-10-06 LAB
ALBUMIN SERPL-MCNC: 3.1 G/DL (ref 3.4–5)
ANION GAP SERPL CALCULATED.3IONS-SCNC: 10 MMOL/L (ref 3–16)
ANION GAP SERPL CALCULATED.3IONS-SCNC: 8 MMOL/L (ref 3–16)
BUN BLDV-MCNC: 20 MG/DL (ref 7–20)
BUN BLDV-MCNC: 21 MG/DL (ref 7–20)
CALCIUM SERPL-MCNC: 10.2 MG/DL (ref 8.3–10.6)
CALCIUM SERPL-MCNC: 9.9 MG/DL (ref 8.3–10.6)
CHLORIDE BLD-SCNC: 101 MMOL/L (ref 99–110)
CHLORIDE BLD-SCNC: 102 MMOL/L (ref 99–110)
CO2: 26 MMOL/L (ref 21–32)
CO2: 28 MMOL/L (ref 21–32)
CREAT SERPL-MCNC: 0.6 MG/DL (ref 0.8–1.3)
CREAT SERPL-MCNC: 0.6 MG/DL (ref 0.8–1.3)
GFR AFRICAN AMERICAN: >60
GFR AFRICAN AMERICAN: >60
GFR NON-AFRICAN AMERICAN: >60
GFR NON-AFRICAN AMERICAN: >60
GLUCOSE BLD-MCNC: 109 MG/DL (ref 70–99)
GLUCOSE BLD-MCNC: 117 MG/DL (ref 70–99)
GLUCOSE BLD-MCNC: 130 MG/DL (ref 70–99)
GLUCOSE BLD-MCNC: 134 MG/DL (ref 70–99)
GLUCOSE BLD-MCNC: 138 MG/DL (ref 70–99)
GLUCOSE BLD-MCNC: 143 MG/DL (ref 70–99)
MAGNESIUM: 1.5 MG/DL (ref 1.8–2.4)
MAGNESIUM: 2.1 MG/DL (ref 1.8–2.4)
PERFORMED ON: ABNORMAL
PHOSPHORUS: 1.6 MG/DL (ref 2.5–4.9)
POTASSIUM REFLEX MAGNESIUM: 3.6 MMOL/L (ref 3.5–5.1)
POTASSIUM SERPL-SCNC: 3.6 MMOL/L (ref 3.5–5.1)
SODIUM BLD-SCNC: 137 MMOL/L (ref 136–145)
SODIUM BLD-SCNC: 138 MMOL/L (ref 136–145)

## 2021-10-06 PROCEDURE — 2580000003 HC RX 258: Performed by: INTERNAL MEDICINE

## 2021-10-06 PROCEDURE — 6370000000 HC RX 637 (ALT 250 FOR IP): Performed by: INTERNAL MEDICINE

## 2021-10-06 PROCEDURE — 36415 COLL VENOUS BLD VENIPUNCTURE: CPT

## 2021-10-06 PROCEDURE — 6360000002 HC RX W HCPCS: Performed by: INTERNAL MEDICINE

## 2021-10-06 PROCEDURE — 1200000000 HC SEMI PRIVATE

## 2021-10-06 PROCEDURE — 51798 US URINE CAPACITY MEASURE: CPT

## 2021-10-06 PROCEDURE — 99232 SBSQ HOSP IP/OBS MODERATE 35: CPT | Performed by: INTERNAL MEDICINE

## 2021-10-06 PROCEDURE — 80069 RENAL FUNCTION PANEL: CPT

## 2021-10-06 PROCEDURE — 83735 ASSAY OF MAGNESIUM: CPT

## 2021-10-06 RX ORDER — HYDROCODONE BITARTRATE AND ACETAMINOPHEN 5; 325 MG/1; MG/1
1 TABLET ORAL EVERY 4 HOURS PRN
Status: DISCONTINUED | OUTPATIENT
Start: 2021-10-06 | End: 2021-10-13 | Stop reason: HOSPADM

## 2021-10-06 RX ADMIN — SODIUM CHLORIDE: 9 INJECTION, SOLUTION INTRAVENOUS at 04:16

## 2021-10-06 RX ADMIN — MAGNESIUM SULFATE HEPTAHYDRATE 2000 MG: 40 INJECTION, SOLUTION INTRAVENOUS at 10:49

## 2021-10-06 RX ADMIN — PANTOPRAZOLE SODIUM 40 MG: 40 TABLET, DELAYED RELEASE ORAL at 05:20

## 2021-10-06 RX ADMIN — ATORVASTATIN CALCIUM 10 MG: 10 TABLET, FILM COATED ORAL at 16:10

## 2021-10-06 RX ADMIN — HEPARIN SODIUM 5000 UNITS: 5000 INJECTION INTRAVENOUS; SUBCUTANEOUS at 16:10

## 2021-10-06 RX ADMIN — SODIUM CHLORIDE, PRESERVATIVE FREE 10 ML: 5 INJECTION INTRAVENOUS at 21:11

## 2021-10-06 RX ADMIN — PANTOPRAZOLE SODIUM 40 MG: 40 TABLET, DELAYED RELEASE ORAL at 16:10

## 2021-10-06 RX ADMIN — DIBASIC SODIUM PHOSPHATE, MONOBASIC POTASSIUM PHOSPHATE AND MONOBASIC SODIUM PHOSPHATE 2 TABLET: 852; 155; 130 TABLET ORAL at 20:59

## 2021-10-06 RX ADMIN — HEPARIN SODIUM 5000 UNITS: 5000 INJECTION INTRAVENOUS; SUBCUTANEOUS at 20:59

## 2021-10-06 RX ADMIN — FENOFIBRATE 160 MG: 160 TABLET ORAL at 16:10

## 2021-10-06 RX ADMIN — DOCUSATE SODIUM 100 MG: 100 CAPSULE, LIQUID FILLED ORAL at 16:11

## 2021-10-06 RX ADMIN — HEPARIN SODIUM 5000 UNITS: 5000 INJECTION INTRAVENOUS; SUBCUTANEOUS at 05:20

## 2021-10-06 ASSESSMENT — PAIN SCALES - WONG BAKER
WONGBAKER_NUMERICALRESPONSE: 0
WONGBAKER_NUMERICALRESPONSE: 4
WONGBAKER_NUMERICALRESPONSE: 0

## 2021-10-06 ASSESSMENT — PAIN SCALES - GENERAL
PAINLEVEL_OUTOF10: 0
PAINLEVEL_OUTOF10: 0

## 2021-10-06 NOTE — PROGRESS NOTES
Patient transferred to room 328 in bed on portable oxygen at 2 liters nasal cannula. Phone and note pad with patient. Spoke with cousin Nasra Rangel the Geoffry Ort informed of room change mu notified of room change.  Bedside report given to [facility]

## 2021-10-06 NOTE — PROGRESS NOTES
Incontinent of urine bladder remains distended bladder scanned for  400 cc urine paged to Dr Laureen Polk regarding this and for something for pain.

## 2021-10-06 NOTE — PROGRESS NOTES
Hospitalist Progress Note    Date of Admission: 10/3/2021    Chief Complaint: Fatigue    Hospital Course:                         78 y.o. male who presented to [facility] with past medical history of depression, hypertension, hyperlipidemia, osteoarthritis, [place], type 2 diabetes, GERD presented to the ED with chief complaint of fatigue. Patient reported fatigue has been going on for over a month progressively worsening does have caregivers at home. Patient reports he lives alone does require assistance. Patient also reports that he has chronic vertigo and dizziness however he does take meclizine. Patient reports that he has been progressively getting worse and not feeling well and has been having constipation as well. With decreased appetite. Patient denied ever having an EGD done nor prior history of cancer. CODE STATUS discussed and the patient proceeded with full code. Subjective: Had some bladder discomfort today. Noted to have > 400 ml PVR. Fernandez placed with relief of symptoms. No chest pain, nausea, vomiting. EGD on hold. Labs:   Recent Labs     10/03/21  2118 10/05/21  0525   WBC 5.7 5.6   HGB 12.7* 13.7   HCT 35.9* 39.0*    197     Recent Labs     10/05/21  0007 10/05/21  0525 10/06/21  0456   * 131* 138   K 4.0 4.5 3.6   CL 96* 98* 102   CO2 27 24 26   BUN 16 15 20   CREATININE 0.6* 0.6* 0.6*   CALCIUM 11.8* 11.6* 10.2   PHOS  --  2.2* 1.6*     Recent Labs     10/03/21  2118   AST 16   ALT 12   BILITOT 0.9   ALKPHOS 42     No results for input(s): INR in the last 72 hours. Physical Exam Performed:    /79   Pulse 86   Temp 97.7 °F (36.5 °C) (Oral)   Resp 20   Ht 5' 8\" (1.727 m)   Wt 162 lb 0.6 oz (73.5 kg)   SpO2 98%   BMI 24.64 kg/m²     General appearance: No apparent distress, appears stated age and cooperative. HEENT: Pupils equal, round, and reactive to light. Conjunctivae/corneas clear. Neck: Supple, no jugular venous distention.  Trachea midline with full range of motion. Respiratory:  Normal respiratory effort. Clear to auscultation, bilaterally without Rales/Wheezes/Rhonchi. Cardiovascular: Regular rate and rhythm with normal S1/S2 without murmurs, rubs or gallops. Abdomen: Soft, non-tender, non-distended with normal bowel sounds. Musculoskelatal: No clubbing, cyanosis. LUE swelling with focal erythema over AC. RUE mild swelling. Full range of motion without deformity. Neurologic:  Neurovascularly intact without any focal sensory/motor deficits. Cranial nerves: II-XII intact, grossly non-focal.  Psychiatric: Alert and oriented, thought content appropriate, normal insight  Skin: Skin color, texture, turgor normal.  No rashes or lesions. Capillary Refill: Brisk,< 3 seconds   Peripheral Pulses: +2 palpable, equal bilaterally       Assessment/Plan:    Active Hospital Problems    Diagnosis     Hyponatremia [E87.1]      Acute hypoxic respiratory failure; etiology unclear, could be edema  CTA negative for PE  Noted pulmonary congestion vs lymphangitic spread  Echocardiogram showed normal LVEF  Ruled out COVID; rapid negative and PCR negative  Wean O2 as tolerated    Esophageal Mass, rule out Esophageal CA  Imaging raises suspicion for pulmonary edema vs lymphangitic spread, possible liver and splenic metastasis. He reports a long standing history of GERD symptoms but does not recall ever having an EGD. GI consulted for assistance; EGD once electrolytes improved  Oncology following     Acute hyponatremia:  Nephrology following  Improving  Replete electrolytes  Monitor off IVF    Hypomagnesemia, severe:   Resolved with supplementation    Hypercalcemia: Suspected malignancy  PTH low  Zoledronic acid given  IVF and Lasix  Improved    Left common iliac: Severe narrowing evident on CT  Asymptomatic    Diabetes Mellitus, Type 2: Controlled. Hold home regimen. Monitor blood sugar and adjust regimen as needed. Diabetic (Carb-controlled) diet when able.      DVT Prophylaxis: Lovenox  Diet: Diet NPO Exceptions are: Ice Chips, Sips of Water with Meds  Code Status: Full Code  PT/OT Eval Status: NA    Dispo - 2-3 days     Fabio Gitelman, MD

## 2021-10-06 NOTE — PROGRESS NOTES
[address] [facility],  [address] [place] [address] Ave  [place], [place] [facility], [place]  Phone: [phone]   [facility] He is a  [5] White (non-) [1] 78 y.o. . male      Main Problems/Chief Complaint for which GI service is seeing pt     Possible esophageal CA. Clinical Summary      Pt got Plavix yesterday. PAST MEDICAL HISTORY     Past Medical History:   Diagnosis Date    Cerebral artery occlusion with cerebral infarction ([address]     Depression     Diabetes mellitus ([address]     Diverticulitis     Fatty liver     GERD (gastroesophageal reflux disease)     Hyperlipidemia     Hypertension     Osteoarthritis      FAMILY HISTORY     Family History   Problem Relation Age of Onset    Cancer Mother     Heart Disease Father     Cancer Father        SURGICAL HISTORY     Past Surgical History:   Procedure Laterality Date    HERNIA REPAIR      LAPAROTOMY  06/14/2018    with dr Marylen Boast 6/14/18     CURRENT MEDICATIONS   (This list may include medications prescribed during this encounter as epic can not insert only the list prior to this encounter.)      ALLERGIES     Allergies   Allergen Reactions    Rosuvastatin      Other reaction(s): Muscle Aches       REVIEW OF SYSTEMS   No chest pain suspicious for cardiac origin  No SOB      FINAL IMPRESSION AND RECOMMENDATIONS     We will postpone EGD to day after tomorrow. I have refreshed his memory about the risks and he understands agrees with it. Plavix should be withheld today and tomorrow. Subcu heparin should be withheld after 4 PM tomorrow for possible EGD on Friday.     The total time spent face-to-face, review of the record and on the rivas during this visit was 25 minutes with more than 50% of the time spent in review of the electronic record which showed Plavix administration yesterday and coordination of care for an EGD and counseling the  patient for the above procedure with the risk benefits and alternatives and the risks which were explained to the patient included, but were not limited to, bleeding, perforation, infection, suppression of breathing, heart attack, stroke, seizures, and remotely even death. Bel Shepard MD 10/6/21 6:07 PM EDT    CC:  SUDHIR Geller - CNP      IMPORTANT: Please note that some portions of this note may have been created using Dragon voice recognition software. Some \"sound-alike\" and totally wrong word substitutions may have taken place due to known inherent limitations of any such software, including this voice recognition software. In spite of efforts to eliminate such errors, some may not have been corrected. So please read the note with this in mind and recognize such mistakes and understand the correct version using the  context. Thanks.

## 2021-10-06 NOTE — PROGRESS NOTES
[facility]    [facility]              [phone]                         Interval History and plan:      Sodium is now normal but she has multiple electrolytes  Imbalance including low magnesium and also low phosphorus  Calcium is coming down  Expect to improve further  Plan:  Increase phosphorus supplement to 500 3 times daily  Adding p.o. 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 intake-CT abdomen suggestive of possible lower esophagus malignancy, CT scan of the lungs showed septal thickening lymphangitic carcinomatosis versus pulmonary edema  Also nonspecific small hypoattenuating lesions within the liver and mild upper abdominal lymphadenopathy  Possible splenic metastasis      [facility] would like to thank Nallely Adams MD   for opportunity to serve this patient      Please call with questions at-   24 Hrs Answering service [phone] or  7 am- 5 pm via Perfect serve or cell phone  Willa Caldera MD          CC/reason for consult :     Hyponatremia/hypercalcemia     HPI :     Freddy Lockhart is a 78 y.o. male presented to   the hospital on 10/3/2021 with fatigue. He has been eating and drinking very poorly for last several days. Has fatigue for more than a month. Also has dizziness, constipated. Has significant loss of weight. History of smoking diabetes GERD. Currently requiring oxygen  His lab was done which was positive for hyponatremia and hypercalcemia. He is currently on IV fluids. He had a CT angiogram done for shortness of breath and was found to have negative for PE. Also found to have hypercalcemia. CT scan of abdomen and pelvis done which showed lower esophagus thickening suggestive of possible malignancy. A  We are consulted for hyponatremia and hypercalcemia      ROS:     Seen with- no family    positives in bold   Constitutional:  fever, chills, weakness, weight change, fatigue  Skin:  rash, pruritus, hair loss, bruising, dry skin, petechiae  Head, Face, Neck   headaches, swelling,  cervical adenopathy  Respiratory: shortness of breath, cough, or wheezing  Cardiovascular: chest pain, palpitations, dizzy, edema  Gastrointestinal: nausea, vomiting, diarrhea, constipation,belly pain    Yellow skin, blood in stool  Musculoskeletal:  back pain, muscle weakness, gait problems,       joint pain or swelling. Genitourinary:  dysuria, poor urine flow, flank pain, blood in urine  Neurologic:  vertigo, TIA'S, syncope, seizures, focal weakness  Psychosocial:  insomnia, anxiety, or depression. Additional positive findings:                  All other remaining systems are negative or unable to obtain        PMH/PSH/SH/Family History:     Past Medical History:   Diagnosis Date    Cerebral artery occlusion with cerebral infarction ([address]     Depression     Diabetes mellitus ([address]     Diverticulitis     Fatty liver     GERD (gastroesophageal reflux disease)     Hyperlipidemia     Hypertension     Osteoarthritis        Past Surgical History:   Procedure Laterality Date    HERNIA REPAIR      LAPAROTOMY  06/14/2018    with dr Babin Russian 6/14/18        reports that he has been smoking cigarettes. He has a 59.00 pack-year smoking history.  He has never used smokeless tobacco. He reports previous alcohol use. He reports that he does not use drugs. family history includes Cancer in his father and mother; Heart Disease in his father.          Medication:     Current Facility-Administered Medications: HYDROcodone-acetaminophen (NORCO) 5-325 MG per tablet 1 tablet, 1 tablet, Oral, Q4H PRN  phosphorus (K PHOS NEUTRAL) tablet 2 tablet, 500 mg, Oral, TID  heparin (porcine) injection 5,000 Units, 5,000 Units, SubCUTAneous, 3 times per day  atorvastatin (LIPITOR) tablet 10 mg, 10 mg, Oral, Daily  clopidogrel (PLAVIX) tablet 75 mg, 75 mg, Oral, Daily  fenofibrate (TRIGLIDE) tablet 160 mg, 160 mg, Oral, Daily  pantoprazole (PROTONIX) tablet 40 mg, 40 mg, Oral, QAM AC  sodium chloride flush 0.9 % injection 10 mL, 10 mL, IntraVENous, 2 times per day  sodium chloride flush 0.9 % injection 10 mL, 10 mL, IntraVENous, PRN  0.9 % sodium chloride infusion, 25 mL, IntraVENous, PRN  potassium chloride 10 mEq/100 mL IVPB (Peripheral Line), 10 mEq, IntraVENous, PRN  magnesium sulfate 2000 mg in 50 mL IVPB premix, 2,000 mg, IntraVENous, PRN  promethazine (PHENERGAN) tablet 12.5 mg, 12.5 mg, Oral, Q6H PRN **OR** ondansetron (ZOFRAN) injection 4 mg, 4 mg, IntraVENous, Q6H PRN  acetaminophen (TYLENOL) tablet 650 mg, 650 mg, Oral, Q6H PRN **OR** acetaminophen (TYLENOL) suppository 650 mg, 650 mg, Rectal, Q6H PRN  docusate sodium (COLACE) capsule 100 mg, 100 mg, Oral, Daily       Vitals :     Vitals:    10/06/21 1455   BP: 129/79   Pulse: 86   Resp: 20   Temp: 97.7 °F (36.5 °C)   SpO2: 98%       I & O :       Intake/Output Summary (Last 24 hours) at 10/6/2021 1535  Last data filed at 10/6/2021 1410  Gross per 24 hour   Intake 4039.8 ml   Output 425 ml   Net 3614.8 ml        Physical Examination :     General appearance: Anxious- no, distressed- no, in good spirits- no ,lethargy   HEENT: Lips- normal, teeth- ok , oral mucosa- moist  Neck : Mass- no, appears symmetrical, JVD- not visible  Respiratory: Respiratory effort-  normal, wheeze- no, crackles - no  Cardiovascular:  Ausculation- No M/R/G, Edema no  Abdomen: visible mass- no, distention- no, scar- no, tenderness- no                            hepatosplenomegaly-  no  Musculoskeletal:  clubbing no,cyanosis- no , digital ischemia- no                           muscle strength- grossly normal , tone - grossly normal  Skin: rashes- no , ulcers- no, induration- no, tightening - no  Psychiatric:  Judgement and insight- normal           AAO X 3  Additional finding: -      LABS:     Recent Labs     10/03/21  2118 10/05/21  0525   WBC 5.7 5.6   HGB 12.7* 13.7   HCT 35.9* 39.0*    197     Recent Labs     10/05/21  0007 10/05/21  0525 10/06/21  0456   * 131* 138   K 4.0 4.5 3.6   CL 96* 98* 102   CO2 27 24 26   BUN 16 15 20   CREATININE 0.6* 0.6* 0.6*   GLUCOSE 154* 150* 138*   MG  --  1.10* 1.50*   PHOS  --  2.2* 1.6*

## 2021-10-06 NOTE — PROGRESS NOTES
Can the process of correction of Mg and Ca++ expedited? EGD is necessary to get the diagnosis established, but I do not want to do it until these two levels in normal range or very close to normal range.

## 2021-10-07 ENCOUNTER — APPOINTMENT (OUTPATIENT)
Dept: GENERAL RADIOLOGY | Age: 76
DRG: 374 | End: 2021-10-07
Payer: MEDICARE

## 2021-10-07 LAB
ALBUMIN SERPL-MCNC: 2.9 G/DL (ref 3.4–5)
ANION GAP SERPL CALCULATED.3IONS-SCNC: 9 MMOL/L (ref 3–16)
BUN BLDV-MCNC: 25 MG/DL (ref 7–20)
CALCIUM SERPL-MCNC: 9.5 MG/DL (ref 8.3–10.6)
CHLORIDE BLD-SCNC: 101 MMOL/L (ref 99–110)
CO2: 26 MMOL/L (ref 21–32)
CREAT SERPL-MCNC: <0.5 MG/DL (ref 0.8–1.3)
GFR AFRICAN AMERICAN: >60
GFR NON-AFRICAN AMERICAN: >60
GLUCOSE BLD-MCNC: 101 MG/DL (ref 70–99)
GLUCOSE BLD-MCNC: 106 MG/DL (ref 70–99)
GLUCOSE BLD-MCNC: 106 MG/DL (ref 70–99)
GLUCOSE BLD-MCNC: 114 MG/DL (ref 70–99)
GLUCOSE BLD-MCNC: 115 MG/DL (ref 70–99)
GLUCOSE BLD-MCNC: 120 MG/DL (ref 70–99)
MAGNESIUM: 1.8 MG/DL (ref 1.8–2.4)
PERFORMED ON: ABNORMAL
PHOSPHORUS: 1.9 MG/DL (ref 2.5–4.9)
POTASSIUM SERPL-SCNC: 3.5 MMOL/L (ref 3.5–5.1)
SODIUM BLD-SCNC: 136 MMOL/L (ref 136–145)

## 2021-10-07 PROCEDURE — 6370000000 HC RX 637 (ALT 250 FOR IP): Performed by: INTERNAL MEDICINE

## 2021-10-07 PROCEDURE — 97530 THERAPEUTIC ACTIVITIES: CPT

## 2021-10-07 PROCEDURE — 2580000003 HC RX 258: Performed by: INTERNAL MEDICINE

## 2021-10-07 PROCEDURE — 80069 RENAL FUNCTION PANEL: CPT

## 2021-10-07 PROCEDURE — 97166 OT EVAL MOD COMPLEX 45 MIN: CPT

## 2021-10-07 PROCEDURE — 92610 EVALUATE SWALLOWING FUNCTION: CPT

## 2021-10-07 PROCEDURE — 92526 ORAL FUNCTION THERAPY: CPT

## 2021-10-07 PROCEDURE — 2700000000 HC OXYGEN THERAPY PER DAY

## 2021-10-07 PROCEDURE — 83735 ASSAY OF MAGNESIUM: CPT

## 2021-10-07 PROCEDURE — 71045 X-RAY EXAM CHEST 1 VIEW: CPT

## 2021-10-07 PROCEDURE — 94761 N-INVAS EAR/PLS OXIMETRY MLT: CPT

## 2021-10-07 PROCEDURE — 6360000002 HC RX W HCPCS: Performed by: INTERNAL MEDICINE

## 2021-10-07 PROCEDURE — 6360000002 HC RX W HCPCS: Performed by: NURSE PRACTITIONER

## 2021-10-07 PROCEDURE — 36415 COLL VENOUS BLD VENIPUNCTURE: CPT

## 2021-10-07 PROCEDURE — 97535 SELF CARE MNGMENT TRAINING: CPT

## 2021-10-07 PROCEDURE — 97162 PT EVAL MOD COMPLEX 30 MIN: CPT

## 2021-10-07 PROCEDURE — 1200000000 HC SEMI PRIVATE

## 2021-10-07 RX ORDER — MORPHINE SULFATE 2 MG/ML
2 INJECTION, SOLUTION INTRAMUSCULAR; INTRAVENOUS EVERY 4 HOURS PRN
Status: DISCONTINUED | OUTPATIENT
Start: 2021-10-07 | End: 2021-10-13 | Stop reason: HOSPADM

## 2021-10-07 RX ORDER — MAGNESIUM SULFATE IN WATER 40 MG/ML
2000 INJECTION, SOLUTION INTRAVENOUS ONCE
Status: COMPLETED | OUTPATIENT
Start: 2021-10-07 | End: 2021-10-07

## 2021-10-07 RX ORDER — DIPHENHYDRAMINE HYDROCHLORIDE 50 MG/ML
12.5 INJECTION INTRAMUSCULAR; INTRAVENOUS ONCE
Status: COMPLETED | OUTPATIENT
Start: 2021-10-07 | End: 2021-10-07

## 2021-10-07 RX ADMIN — HYDROCODONE BITARTRATE AND ACETAMINOPHEN 1 TABLET: 5; 325 TABLET ORAL at 02:23

## 2021-10-07 RX ADMIN — SODIUM CHLORIDE, PRESERVATIVE FREE 10 ML: 5 INJECTION INTRAVENOUS at 20:42

## 2021-10-07 RX ADMIN — HYDROCODONE BITARTRATE AND ACETAMINOPHEN 1 TABLET: 5; 325 TABLET ORAL at 10:52

## 2021-10-07 RX ADMIN — MAGNESIUM SULFATE HEPTAHYDRATE 2000 MG: 40 INJECTION, SOLUTION INTRAVENOUS at 13:15

## 2021-10-07 RX ADMIN — DIPHENHYDRAMINE HYDROCHLORIDE 12.5 MG: 50 INJECTION, SOLUTION INTRAMUSCULAR; INTRAVENOUS at 23:07

## 2021-10-07 RX ADMIN — SODIUM CHLORIDE, PRESERVATIVE FREE 10 ML: 5 INJECTION INTRAVENOUS at 10:36

## 2021-10-07 RX ADMIN — HEPARIN SODIUM 5000 UNITS: 5000 INJECTION INTRAVENOUS; SUBCUTANEOUS at 14:32

## 2021-10-07 RX ADMIN — MORPHINE SULFATE 2 MG: 2 INJECTION, SOLUTION INTRAMUSCULAR; INTRAVENOUS at 20:42

## 2021-10-07 ASSESSMENT — PAIN SCALES - GENERAL
PAINLEVEL_OUTOF10: 8
PAINLEVEL_OUTOF10: 5
PAINLEVEL_OUTOF10: 6

## 2021-10-07 ASSESSMENT — PAIN SCALES - WONG BAKER
WONGBAKER_NUMERICALRESPONSE: 0

## 2021-10-07 NOTE — PROGRESS NOTES
ONCOLOGY HEMATOLOGY CARE PROGRESS NOTE      SUBJECTIVE:       Awaiting EGD tomorrow. Otherwise, no complaints. He is tired. ROS:     Constitutional:  No weight loss, No fever, No chills, No night sweats. Energy level good. Eyes:  No impairment or change in vision  ENT / Mouth:  No pain, abnormal ulceration, bleeding, nasal drip or change in voice or hearing  Cardiovascular:  No chest pain, palpitations, new edema, or calf discomfort  Respiratory:  No pain, hemoptysis, change to breathing  Breast:  No pain, discharge, change in appearance or texture  Gastrointestinal:  + unintentional weight loss and early sat  iety Urinary:  No pain, bleeding or change in continence  Genitalia: No pain, bleeding or discharge  Musculoskeletal:  No redness, pain, edema or weakness  Skin:  No pruritus, rash, change to nodules or lesions  Neurologic:  No discomfort, change in mental status, speech, sensory or motor activity  Psychiatric:  No change in concentration or change to affect or mood  Endocrine:  No hot flashes, increased thirst, or change to urine production  Hematologic: No petechiae, ecchymosis or bleeding  Lymphatic:  No lymphadenopathy or lymphedema  Allergy / Immunologic:  No eczema, hives, frequent or recurrent infections    OBJECTIVE        Physical    VITALS:  /71   Pulse 100   Temp 98.4 °F (36.9 °C) (Oral)   Resp 19   Ht 5' 8\" (1.727 m)   Wt 170 lb 10.2 oz (77.4 kg)   SpO2 91%   BMI 25.95 kg/m²   TEMPERATURE:  Current - Temp: 98.4 °F (36.9 °C);  Max - Temp  Av.2 °F (36.8 °C)  Min: 97.7 °F (36.5 °C)  Max: 98.5 °F (36.9 °C)  PULSE OXIMETRY RANGE: SpO2  Av %  Min: 91 %  Max: 98 %  24HR INTAKE/OUTPUT:    Intake/Output Summary (Last 24 hours) at 10/7/2021 1147  Last data filed at 10/7/2021 0521  Gross per 24 hour   Intake 180 ml   Output 825 ml   Net -645 ml       CONSTITUTIONAL: awake, alert, cooperative, no apparent distress   EYES: pupils equal, round and reactive to light, sclera clear and conjunctiva normal  ENT: Normocephalic, without obvious abnormality, atraumatic  NECK: supple, symmetrical, no jugular venous distension and no carotid bruits   HEMATOLOGIC/LYMPHATIC: no cervical, supraclavicular or axillary lymphadenopathy   LUNGS: no increased work of breathing and clear to auscultation   CARDIOVASCULAR: regular rate and rhythm, normal S1 and S2, no murmur noted  ABDOMEN: normal bowel sounds x 4, soft, non-distended, non-tender, no masses palpated, no hepatosplenomgaly   MUSCULOSKELETAL: full range of motion noted, tone is normal  NEUROLOGIC: awake, alert, oriented to name, place and time. Motor skills grossly intact. SKIN: Normal skin color, texture, turgor and no jaundice. appears intact   EXTREMITIES: no LE edema       Data      Recent Labs     10/05/21  0525   WBC 5.6   HGB 13.7   HCT 39.0*      MCV 90.6        Recent Labs     10/05/21  0525 10/05/21  0525 10/06/21  0456 10/06/21  1516 10/07/21  0539   *   < > 138 137 136   K 4.5   < > 3.6 3.6 3.5   CL 98*   < > 102 101 101   CO2 24   < > 26 28 26   PHOS 2.2*  --  1.6*  --  1.9*   BUN 15   < > 20 21* 25*   CREATININE 0.6*   < > 0.6* 0.6* <0.5*    < > = values in this interval not displayed. No results for input(s): AST, ALT, ALB, BILIDIR, BILITOT, ALKPHOS in the last 72 hours.     Magnesium:    Lab Results   Component Value Date    MG 1.80 10/07/2021    MG 2.10 10/06/2021    MG 1.50 10/06/2021         Problem List  Patient Active Problem List   Diagnosis    Degeneration of cervical intervertebral disc    Degeneration of lumbar or lumbosacral intervertebral disc    Lumbar radiculopathy    Cervical radiculopathy, chronic    Brain TIA    Type 2 diabetes mellitus without complication, without long-term current use of insulin (HCC)    Mixed hyperlipidemia    Essential hypertension    Gastroesophageal reflux disease without esophagitis    C[facility]brovascular [facility] ([facility]A) ([facility]    Iron deficiency anemia secondary to inadequate dietary iron intake    Colonoscopy refused    Hyponatremia       ASSESSMENT AND PLAN:      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 - Will send CARIS testing outpatient once diagnosis established.  Will need HER2, PDL-1, TMB testing, etc.         ONCOLOGIC DISPOSITION: await biopsy     Nitin Sparks CNP   Please contact through Perfect Serve

## 2021-10-07 NOTE — PROGRESS NOTES
Physical Therapy    Facility/Department: [facility] C3 TELE/MED SURG/ONC  Initial Assessment    NAME: Freddy Lockhart  : 1945  MRN: [id number]    Date of Service: 10/7/2021    Discharge Recommendations:  Subacute/Skilled Nursing Facility   PT Equipment Recommendations  Equipment Needed: No (TBD at SNF)    Assessment   Body structures, Functions, Activity limitations: Decreased functional mobility ; Decreased strength;Decreased cognition;Decreased endurance;Decreased balance;Decreased posture;Decreased safe awareness  Assessment: Pt referred for PT evaluation during current hospital stay with dx of weakness and newly discovered esophageal mass. Pt currently seems to be functioning below his baseline, requiring min/CGA x 1 for safe transfers with support of RW. Pt too weak/unsteady today to attempt amb beyond ~3 feet from bed>chair. Given current deficits and the fact that pt lives alone, recommend SNF at D/C. Treatment Diagnosis: Generalized weakness, decreased (I) with mobility  Specific instructions for Next Treatment: Progress ther ex and mobility as tolerated  Prognosis: Good;Fair  Decision Making: Medium Complexity  PT Education: Goals; General Safety;Gait Training;PT Role;Disease Specific Education;Plan of Care; Functional Mobility Training;Equipment;Precautions;Transfer Training;Energy Conservation  Patient Education: Disease-specific education: Pt educated on safe technique with transfers/amb with use of RW; pt verbalizes some understanding but will require reinforcement. Barriers to Learning: Impaired cognition/delayed processing  REQUIRES PT FOLLOW UP: Yes  Activity Tolerance: Patient Tolerated treatment well;Patient limited by fatigue  Activity Tolerance: Pt with c/o dizziness upon initially sitting at EOB. At EOB: BP = 139/67, HR = 105 bpm, O2 sat = 98% on room air. Patient Diagnosis(es): The primary encounter diagnosis was Hypercalcemia.  Diagnoses of Hyponatremia, Hypoxia, Hypoglycemia, Elevated Finger  O2 Device: Nasal cannula  O2 Flow Rate (L/min): 4 L/min  Intervention List: Patient able to continue with treatment    Orientation  Orientation  Overall Orientation Status: Within Normal Limits (despite orientation being WNL, pt somewhat confused in other areas (i.e. rambling speech at times, speaking on unrelated topics, not responding appropriately to certain commands))     Social/Functional History  Social/Functional History  Lives With: Alone  Type of Home: House  Home Layout: One level  Home Access: Stairs to enter without rails  Entrance Stairs - Number of Steps: 1+2+1 RAÚL  Bathroom Shower/Tub:  (pt only takes sponge baths)  Bathroom Toilet: Standard  Home Equipment:  (no home DME or home O2 at baseline)  Receives Help From: Home health ([facility] 7 days/week, 4 hrs/day to help with homemaking)  ADL Assistance: [address] [place] [address]: Needs assistance (HHA assists with homemaking PRN, pt fixes himself coffee in the mornings)  Ambulation Assistance: Independent (without AD)  Transfer Assistance: Independent  Active : No  Mode of Transportation: Other (caregiver)  Occupation: Retired  Type of occupation: Worked at a [employer]: Used to enjoy playing golf, spends time with family & great-grandchildren  Additional Comments: Pt denies any falls in the past few months.     Objective  Observation/Palpation  Posture: Fair    RLE AROM: WFL  LLE AROM : WFL  Strength RLE: Grossly 4-/5 to 4/5 throughout  Strength LLE: Grossly 4-/5 to 4/5 throughout     Bed mobility  Supine to Sit: Stand by assistance (moving toward R side, pt transfers slowly and with some difficulty 2* weakness)  Scooting: Stand by assistance     Transfers  Sit to Stand: Minimal Assistance  Stand to sit: Contact guard assistance  Bed to Chair: Contact guard assistance (using RW, moving toward R side from bed>chair, mod cues needed for safe technique)     Ambulation  Surface: level tile  Device: Rolling Walker  Assistance: Minimal assistance  Quality of Gait: Pt able to take several small, shuffling steps from bed>chair totaling ~3 feet with moderate unsteadiness. Max cues needed at times for safety. Gait Deviations: Slow Inocencia;Decreased step length;Decreased step height;Shuffles  Distance: x 3 feet from bed>chair     Balance  Posture: Fair  Sitting - Static: Good  Sitting - Dynamic: Fair;+  Standing - Static: Fair  Standing - Dynamic: Fair;-    Plan   Times per week: 3-5x/week in acute care  Times per day: Daily  Specific instructions for Next Treatment: Progress ther ex and mobility as tolerated  Current Treatment Recommendations: Strengthening, Balance Training, Endurance Training, Functional Mobility Training, Transfer Training, Gait Training, Safety Education & Training, Patient/Caregiver Education & Training, Equipment Evaluation, Education, & procurement  Safety Devices: All fall risk precautions in place, Left in chair, Call light within reach, Chair alarm in place, Nurse notified, Gait belt, Patient at risk for falls    AM-PAC Score  AM-PAC Inpatient Mobility Raw Score : 17 (10/07/21 1716)  AM-PAC Inpatient T-Scale Score : 42.13 (10/07/21 1716)  Mobility Inpatient CMS 0-100% Score: 50.57 (10/07/21 1716)  Mobility Inpatient CMS G-Code Modifier : CK (10/07/21 1716)    Goals  Short term goals  Time Frame for Short term goals: 1 week, 10/14/21 (unless otherwise specified)  Short term goal 1: Pt will transfer supine <-> sit with supervision  Short term goal 2: Pt will transfer sit <-> stand and bed>chair using least AD with supervision  Short term goal 3: Pt will ambulate x 50 feet using least AD with CGA x 1  Short term goal 4: By 10/10/21: Pt will tolerate 12-15 reps BLE exercise for strengthening, balance, and endurance  Patient Goals   Patient goals :  \"To get stronger\"       Therapy Time   Individual Concurrent Group Co-treatment   Time In 7716         Time Out 1544         Minutes 35         Timed Code Treatment Minutes: [address] [place], [place] #[id number]    If pt is unable to be seen after this session, please let this note serve as discharge summary. Please see case management note for discharge disposition. Thank you.

## 2021-10-07 NOTE — PROGRESS NOTES
Occupational Therapy   Occupational Therapy Initial Assessment and Treatment Note  Date: 10/7/2021   Patient Name: Kennedy Hanks  MRN: [id number]     : 1945    Date of Service: 10/7/2021    Discharge Recommendations:  Subacute/Skilled Nursing Facility     Assessment   Performance deficits / Impairments: Decreased functional mobility ; Decreased ADL status; Decreased safe awareness;Decreased cognition;Decreased balance;Decreased coordination;Decreased endurance  Prognosis: Good  After evaluation, pt found to be presenting with the above mentioned occupational performance deficits which are affecting participation in daily living skills. Pt would benefit from continued skilled occupational therapy to address ADLs, functional mobility, and safety while in acute care. Decision Making: Medium Complexity  OT Education: OT Role;Plan of Care;Transfer Training;ADL Adaptive Strategies;Orientation  Disease Specific Education: Pt educated on importance of OOB mobility, prevention of complications of bedrest, and general safety during hospitalization. Pt verbalized understanding  Barriers to Learning: cognition  REQUIRES OT FOLLOW UP: Yes  Activity Tolerance  Activity Tolerance: Patient limited by fatigue;Treatment limited secondary to decreased cognition  Safety Devices  Safety Devices in place: Yes  Type of devices: Gait belt;Call light within reach; Chair alarm in place;Nurse notified; Left in chair         Patient Diagnosis(es): The primary encounter diagnosis was Hypercalcemia. Diagnoses of Hyponatremia, Hypoxia, Hypoglycemia, Elevated troponin, Lactic acidosis, Abnormal CT scan, esophagus, Abnormal CT scan of lung, Lesion of spleen, Liver lesion, and Abdominal lymphadenopathy were also pertinent to this visit.      has a past medical history of Cerebral artery occlusion with cerebral infarction ([facility] [address] Depression, Diabetes mellitus ([facility] [address] Diverticulitis, Fatty liver, GERD (gastroesophageal reflux disease), Hyperlipidemia, Hypertension, and Osteoarthritis. has a past surgical history that includes hernia repair and laparotomy (06/14/2018). Restrictions  Restrictions/Precautions  Restrictions/Precautions: General Precautions, Fall Risk  Position Activity Restriction  Other position/activity restrictions: High fall risk per nursing assessment, up with assistance, 4L O2, IV lines    Subjective   General  Chart Reviewed: Yes  Patient assessed for rehabilitation services?: Yes  Referring Practitioner: Dang Tam  Diagnosis: hypercalcemia, esophageal mass, liver met  Subjective  Subjective: Pt agreeable to OT  General Comment  Comments: RN approved therapy  Patient Currently in Pain: Denies (pt denies pain when asked although observed to voice lots of discomfort near Fernandez catheter site during session)  Vital Signs  Patient Currently in Pain: Denies (pt denies pain when asked although observed to voice lots of discomfort near Fernandez catheter site during session)  Social/Functional History  Social/Functional History  Lives With: Alone  Type of Home: House  Home Layout: One level  Home Access: Stairs to enter without rails  Entrance Stairs - Number of Steps: 1+2+1 RAÚL  Bathroom Shower/Tub:  (pt only takes sponge baths)  Bathroom Toilet: Standard  Home Equipment:  (no home DME or home O2 at baseline)  Receives Help From: Home health ([facility] 7 days/week, 4 hrs/day to help with homemaking)  ADL Assistance: [address] [place] [address] Avenue: Needs assistance ([facility] assists with homemaking PRN, pt fixes himself coffee in the mornings)  Ambulation Assistance: Independent (without AD)  Transfer Assistance: Independent  Active : No  Mode of Transportation: Other (caregiver)  Occupation: Retired  Type of occupation: Worked at a [employer]: Used to enjoy playing golf, spends time with family & great-grandchildren  Additional Comments: Pt denies any falls in the past few months.      Objective   Vision: Within Functional Mobility Training, Balance Training, Endurance Training, Safety Education & Training           AM-PAC Score   AM-PAC Inpatient Daily Activity Raw Score: 14 (10/07/21 1747)  AM-PAC Inpatient ADL T-Scale Score : 33.39 (10/07/21 1747)  ADL Inpatient CMS 0-100% Score: 59.67 (10/07/21 1747)  ADL Inpatient CMS G-Code Modifier : CK (10/07/21 1747)    Goals  Short term goals  Time Frame for Short term goals: 1 week (10/14) unless noted  Short term goal 1: Perform functional transfers with SBA and RW  Short term goal 2: Perform UE exer 15x each to improve endurance by 10/11  Short term goal 3: Perform 2-3 UE ADL tasks while seated and SBA  Patient Goals   Patient goals : \"Go home\"     Therapy Time   Individual Concurrent Group Co-treatment   Time In 1509         Time Out 1544         Minutes 35         Timed Code Treatment Minutes: 25 Minutes (10 min eval)    If pt is discharged prior to next OT session, this note will serve as the discharge summary.   Tati Sims OT

## 2021-10-07 NOTE — PROGRESS NOTES
VSS. SpO2 maintaining 90-91% now on 4L NC. Pt not tolerating liquids without freq coughing immediately after swallowing. All PO AM meds held for now, awaiting MD rounds. Pt is a/o x4 this morning however does have interm episodes of confusion/forgetfullness but can be reoriented quite quickly. Denies further needs at this time. Bed alarm remains active. Call light placed within reach. Will monitor.

## 2021-10-07 NOTE — PROGRESS NOTES
[facility] NEPHROLOGY    [facility]              [phone]                         Interval History and plan:      His phosphorus low at 1.9  Magnesium low at 1.8  Potassium on the low side  Calcium now normal at 9.5    Plan:  Continue increased dose of phosphorus  We will give IV magnesium again  Getting creatinine potassium and phosphorus as potassium as well                   Assessment :     Hyponatremia  Clinically hypovolemic on presentation  Sodium 124 on admission-coming up to 126 on consultation  Urine sodium 32    He has possible bowel cancer with metastasis  Urine sodium is consistent with hypovolemic but he may also have SIADH component      Hypercalcemia  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 intake-CT abdomen suggestive of possible lower esophagus malignancy, CT scan of the lungs showed septal thickening lymphangitic carcinomatosis versus pulmonary edema  Also nonspecific small hypoattenuating lesions within the liver and mild upper abdominal lymphadenopathy  Possible splenic metastasis      [facility] Nephrology would like to thank Mirella Og MD   for opportunity to serve this patient      Please call with questions at-   24 Hrs Answering service [phone] or  7 am- 5 pm via Perfect serve or cell phone  Dez Mata MD          CC/reason for consult :     Hyponatremia/hypercalcemia     HPI :     Gin Plata is a 78 y.o. male presented to   the hospital on 10/3/2021 with fatigue. He has been eating and drinking very poorly for last several days. Has fatigue for more than a month. Also has dizziness, constipated. Has significant loss of weight. History of smoking diabetes GERD. Currently requiring oxygen  His lab was done which was positive for hyponatremia and hypercalcemia. He is currently on IV fluids.   He had a CT angiogram done for shortness of breath and was found to have negative for PE. Also found to have hypercalcemia. CT scan of abdomen and pelvis done which showed lower esophagus thickening suggestive of possible malignancy. A  We are consulted for hyponatremia and hypercalcemia      ROS:     Seen with- no family    positives in bold   Constitutional:  fever, chills, weakness, weight change, fatigue  Skin:  rash, pruritus, hair loss, bruising, dry skin, petechiae  Head, Face, Neck   headaches, swelling,  cervical adenopathy  Respiratory: shortness of breath, cough, or wheezing  Cardiovascular: chest pain, palpitations, dizzy, edema  Gastrointestinal: nausea, vomiting, diarrhea, constipation,belly pain    Yellow skin, blood in stool  Musculoskeletal:  back pain, muscle weakness, gait problems,       joint pain or swelling. Genitourinary:  dysuria, poor urine flow, flank pain, blood in urine  Neurologic:  vertigo, TIA'S, syncope, seizures, focal weakness  Psychosocial:  insomnia, anxiety, or depression. Additional positive findings:                  All other remaining systems are negative or unable to obtain        PMH/PSH/SH/Family History:     Past Medical History:   Diagnosis Date    Cerebral artery occlusion with cerebral infarction ([facility] [address]     Depression     Diabetes mellitus ([facility] [address]     Diverticulitis     Fatty liver     GERD (gastroesophageal reflux disease)     Hyperlipidemia     Hypertension     Osteoarthritis        Past Surgical History:   Procedure Laterality Date    HERNIA REPAIR      LAPAROTOMY  06/14/2018    with dr Quynh Rapp 6/14/18        reports that he has been smoking cigarettes. He has a 59.00 pack-year smoking history. He has never used smokeless tobacco. He reports previous alcohol use. He reports that he does not use drugs. family history includes Cancer in his father and mother; Heart Disease in his father.          Medication:     Current Facility-Administered Medications: HYDROcodone-acetaminophen (NORCO) 5-325 MG per tablet 1 tablet, 1 tablet, Oral, Q4H PRN  phosphorus (K PHOS NEUTRAL) tablet 2 tablet, 500 mg, Oral, TID  heparin (porcine) injection 5,000 Units, 5,000 Units, SubCUTAneous, 3 times per day  atorvastatin (LIPITOR) tablet 10 mg, 10 mg, Oral, Daily  fenofibrate (TRIGLIDE) tablet 160 mg, 160 mg, Oral, Daily  pantoprazole (PROTONIX) tablet 40 mg, 40 mg, Oral, QAM AC  sodium chloride flush 0.9 % injection 10 mL, 10 mL, IntraVENous, 2 times per day  sodium chloride flush 0.9 % injection 10 mL, 10 mL, IntraVENous, PRN  0.9 % sodium chloride infusion, 25 mL, IntraVENous, PRN  potassium chloride 10 mEq/100 mL IVPB (Peripheral Line), 10 mEq, IntraVENous, PRN  magnesium sulfate 2000 mg in 50 mL IVPB premix, 2,000 mg, IntraVENous, PRN  promethazine (PHENERGAN) tablet 12.5 mg, 12.5 mg, Oral, Q6H PRN **OR** ondansetron (ZOFRAN) injection 4 mg, 4 mg, IntraVENous, Q6H PRN  acetaminophen (TYLENOL) tablet 650 mg, 650 mg, Oral, Q6H PRN **OR** acetaminophen (TYLENOL) suppository 650 mg, 650 mg, Rectal, Q6H PRN  docusate sodium (COLACE) capsule 100 mg, 100 mg, Oral, Daily       Vitals :     Vitals:    10/07/21 0930   BP:    Pulse: 100   Resp: 19   Temp: 98.4 °F (36.9 °C)   SpO2: 91%       I & O :       Intake/Output Summary (Last 24 hours) at 10/7/2021 1119  Last data filed at 10/7/2021 0521  Gross per 24 hour   Intake 180 ml   Output 825 ml   Net -645 ml        Physical Examination :     General appearance: Anxious- no, distressed- no, in good spirits- no ,lethargy   HEENT: Lips- normal, teeth- ok , oral mucosa- moist  Neck : Mass- no, appears symmetrical, JVD- not visible  Respiratory: Respiratory effort-  normal, wheeze- no, crackles - no  Cardiovascular:  Ausculation- No M/R/G, Edema no  Abdomen: visible mass- no, distention- no, scar- no, tenderness- no                            hepatosplenomegaly-  no  Musculoskeletal:  clubbing no,cyanosis- no , digital ischemia- no muscle strength- grossly normal , tone - grossly normal  Skin: rashes- no , ulcers- no, induration- no, tightening - no  Psychiatric:  Judgement and insight- normal           AAO X 3  Additional finding: -      LABS:     Recent Labs     10/05/21  0525   WBC 5.6   HGB 13.7   HCT 39.0*        Recent Labs     10/05/21  0525 10/05/21  0525 10/06/21  0456 10/06/21  1516 10/07/21  0539   *   < > 138 137 136   K 4.5   < > 3.6 3.6 3.5   CL 98*   < > 102 101 101   CO2 24   < > 26 28 26   BUN 15   < > 20 21* 25*   CREATININE 0.6*   < > 0.6* 0.6* <0.5*   GLUCOSE 150*   < > 138* 117* 106*   MG 1.10*   < > 1.50* 2.10 1.80   PHOS 2.2*  --  1.6*  --  1.9*    < > = values in this interval not displayed.

## 2021-10-07 NOTE — PROGRESS NOTES
Pt A&O x4. VSS. Denies dyspnea and N/V. Pt is on 2 liters of oxygen. Reports passing flatus. Assessment is as charted. Call light and bedside table within reach, wheels locked, bed in lowest position, Pt instructed to call out for assistance and expressed understanding, calls out appropriately.

## 2021-10-07 NOTE — CARE COORDINATION
Hospital day 3: Patient transfer from C4 to C3 followed by IM, Hemo/Onco, GI, and Nephrology. Patient from home with PRN support for housekeeping. Patient 92% on 2L 02, baseline room air. Patient with plans for EGD on 10/08 per GI. SW will ct to follow for DCP needs. LINWOOD Ma

## 2021-10-07 NOTE — PROGRESS NOTES
Hospitalist Progress Note    Date of Admission: 10/3/2021    Chief Complaint: Fatigue    Hospital Course:                         78 y.o. male who presented to [facility] with past medical history of depression, hypertension, hyperlipidemia, osteoarthritis, Tracie Vinny, type 2 diabetes, GERD presented to the ED with chief complaint of fatigue. Patient reported fatigue has been going on for over a month progressively worsening does have caregivers at home. Patient reports he lives alone does require assistance. Patient also reports that he has chronic vertigo and dizziness however he does take meclizine. Patient reports that he has been progressively getting worse and not feeling well and has been having constipation as well. With decreased appetite. Patient denied ever having an EGD done nor prior history of cancer. CODE STATUS discussed and the patient proceeded with full code. Subjective: Stable. No chest pain, nausea, vomiting. EGD planned for tomorrow now. Labs:   Recent Labs     10/05/21  0525   WBC 5.6   HGB 13.7   HCT 39.0*        Recent Labs     10/05/21  0525 10/05/21  0525 10/06/21  0456 10/06/21  1516 10/07/21  0539   *   < > 138 137 136   K 4.5   < > 3.6 3.6 3.5   CL 98*   < > 102 101 101   CO2 24   < > 26 28 26   BUN 15   < > 20 21* 25*   CREATININE 0.6*   < > 0.6* 0.6* <0.5*   CALCIUM 11.6*   < > 10.2 9.9 9.5   PHOS 2.2*  --  1.6*  --  1.9*    < > = values in this interval not displayed. No results for input(s): AST, ALT, BILIDIR, BILITOT, ALKPHOS in the last 72 hours. No results for input(s): INR in the last 72 hours. Physical Exam Performed:    /71   Pulse 91   Temp 98.5 °F (36.9 °C) (Oral)   Resp 18   Ht 5' 8\" (1.727 m)   Wt 170 lb 10.2 oz (77.4 kg)   SpO2 92%   BMI 25.95 kg/m²     General appearance: No apparent distress, appears stated age and cooperative. HEENT: Pupils equal, round, and reactive to light. Conjunctivae/corneas clear.   Neck:

## 2021-10-07 NOTE — PROGRESS NOTES
Perfect serve message sent to Johnathon Bachelor NP, \"Pt only has prn PO medication but is having a lot of trouble swallowing. Speech is recommending nothing PO. Are we able to get a prn IV pain medication? thanks \", awaiting response. See new order    Message sent to provider [phone] \"Pt states that pain medication is not relieving pain and he is unable to sleep, are we able to try something different?  Thanks\"    See new order

## 2021-10-07 NOTE — PROGRESS NOTES
Plan  Requires SLP Intervention: Yes  Duration/Frequency of Treatment: 3-5x/wk for 1 week until 10/14/21          Recommended Diet and Intervention  Diet Solids Recommendation: NPO (consider alternative means of nutrition while SLP completing ongoing assessment)     Recommended Form of Meds: Via alternative means of nutrition  Recommendations: NPO;Consider alternative nutrition (Consider 2-3 ice chips following pasted toothbrushing in order to combat atrophy of swallow)       Compensatory Swallowing Strategies   Pasted toothbrushing with suction, 2x/day; 2-3 ice chips after oral care    Treatment/Goals  Short-term Goals  Timeframe for Short-term Goals: 1 week, 3-5x/wk until 10/14/21  Long-term Goals  Timeframe for Long-term Goals: 1-2 weeks  Goal 1: Pt will tolerate ongoing assessment of swallowing at bedside. Dysphagia Goals: The patient will tolerate repeat BSE when able. General  Chart Reviewed: Yes  Behavior/Cognition: Alert; Cooperative;Confused  O2 Device: Nasal cannula  Liters of Oxygen: 4 L  Communication Observation: Functional  Follows Directions: Simple  Dentition: Adequate; Some missing teeth  Patient Positioning: Upright in chair  Baseline Vocal Quality: Normal  Volitional Cough: Congested;Weak  Prior Dysphagia History: Pt is not reliable historian at time of assessment. SLP asked specifically the types of foods that the pt is having difficulty swallowing, and he is unable to provide this information. Pt perseverates on his catheter and that he does not like the consitency of jell-o. Per hcart, pt has had unintentional weight loss recently and has been unable to keep foods and liquids down. Consistencies Administered: Ice Chips; Thin - teaspoon; Thin - cup           Vision/Hearing  Vision: Within Functional Limits  Hearing: Within functional limits    Oral Motor Deficits  Oral Motor:  Within functional limits    Oral Phase Dysfunction  Oral Phase  Oral Phase: WNL  Oral Phase  Oral Phase - Comment: Oral phase is WFL at time of evaluation. Thin liquid trials only as pt is NPO for EGD tomorrow (10/09/21). Indicators of Pharyngeal Phase Dysfunction   Pharyngeal Phase  Pharyngeal Phase: Exceptions  Indicators of Pharyngeal Phase Dysfunction  Decreased Laryngeal Elevation: All  Throat Clearing - Immediate: Thin - teaspoon  Pharyngeal Phase   Pharyngeal: RR 24/min prior to po trials with O2 sat of 96% on 4lpm O2 via NC. Pt initially appears to be tolerating thin liquids via ice chip and small sips. Eventually, pt reprots that the liquids are stuck (pt pointing to mid-sternum), and then pt begins exhbiting wet belching with deep, wet coughing post swallow. Suspected regurgitation of po trials with aspiration after the swallow. SLP stayed at side of chair with pt as he had prolonged coughing fit after this suspected regurgitation. Coughing resolved and SLP elected to discontinue po trials and await results of EGD tomorrow. At this time, pt does not appear to be able to tolerate thin liquids due to regurgitation wiht aspiration after the swallow. RR stable at 24-28/min post po trials with O2 sat of 93%. Prognosis  Prognosis for safe diet advancement: guarded  Barriers to reach goals: cognitive deficits;severity of dysphagia  Consulted and agree with results and recommendations: Patient;RN    Education  Patient Education: Dysphagia Tx: Education with pt at bedside regarding results of BSE, aspiration precautions, and SLP plan of care  Patient Education Response: Verbalizes understanding;Needs reinforcement  Safety Devices in place: Yes  Type of devices: All fall risk precautions in place;Call light within reach; Chair alarm in place       Therapy Time  SLP Individual Minutes  Time In: 6639  Time Out: 1640  Minutes: [facility].  [id number]  Speech-Language Pathologist      10/7/2021 4:46 PM

## 2021-10-08 ENCOUNTER — ANESTHESIA EVENT (OUTPATIENT)
Dept: ENDOSCOPY | Age: 76
DRG: 374 | End: 2021-10-08
Payer: MEDICARE

## 2021-10-08 ENCOUNTER — ANESTHESIA (OUTPATIENT)
Dept: ENDOSCOPY | Age: 76
DRG: 374 | End: 2021-10-08
Payer: MEDICARE

## 2021-10-08 VITALS — DIASTOLIC BLOOD PRESSURE: 68 MMHG | SYSTOLIC BLOOD PRESSURE: 124 MMHG | OXYGEN SATURATION: 100 %

## 2021-10-08 LAB
ALBUMIN SERPL-MCNC: 2.8 G/DL (ref 3.4–5)
ANION GAP SERPL CALCULATED.3IONS-SCNC: 13 MMOL/L (ref 3–16)
BLOOD CULTURE, ROUTINE: NORMAL
BUN BLDV-MCNC: 33 MG/DL (ref 7–20)
CALCIUM SERPL-MCNC: 9.5 MG/DL (ref 8.3–10.6)
CHLORIDE BLD-SCNC: 102 MMOL/L (ref 99–110)
CO2: 26 MMOL/L (ref 21–32)
CREAT SERPL-MCNC: 0.6 MG/DL (ref 0.8–1.3)
CULTURE, BLOOD 2: NORMAL
GFR AFRICAN AMERICAN: >60
GFR NON-AFRICAN AMERICAN: >60
GLUCOSE BLD-MCNC: 100 MG/DL (ref 70–99)
GLUCOSE BLD-MCNC: 102 MG/DL (ref 70–99)
GLUCOSE BLD-MCNC: 109 MG/DL (ref 70–99)
GLUCOSE BLD-MCNC: 112 MG/DL (ref 70–99)
GLUCOSE BLD-MCNC: 121 MG/DL (ref 70–99)
GLUCOSE BLD-MCNC: 127 MG/DL (ref 70–99)
GLUCOSE BLD-MCNC: 128 MG/DL (ref 70–99)
MAGNESIUM: 1.9 MG/DL (ref 1.8–2.4)
PERFORMED ON: ABNORMAL
PHOSPHORUS: 1.7 MG/DL (ref 2.5–4.9)
POTASSIUM SERPL-SCNC: 3.9 MMOL/L (ref 3.5–5.1)
SODIUM BLD-SCNC: 141 MMOL/L (ref 136–145)

## 2021-10-08 PROCEDURE — 6370000000 HC RX 637 (ALT 250 FOR IP)

## 2021-10-08 PROCEDURE — 6360000002 HC RX W HCPCS: Performed by: NURSE PRACTITIONER

## 2021-10-08 PROCEDURE — 94761 N-INVAS EAR/PLS OXIMETRY MLT: CPT

## 2021-10-08 PROCEDURE — 3609012400 HC EGD TRANSORAL BIOPSY SINGLE/MULTIPLE: Performed by: INTERNAL MEDICINE

## 2021-10-08 PROCEDURE — 7100000010 HC PHASE II RECOVERY - FIRST 15 MIN: Performed by: INTERNAL MEDICINE

## 2021-10-08 PROCEDURE — 83735 ASSAY OF MAGNESIUM: CPT

## 2021-10-08 PROCEDURE — 6360000002 HC RX W HCPCS: Performed by: NURSE ANESTHETIST, CERTIFIED REGISTERED

## 2021-10-08 PROCEDURE — 97110 THERAPEUTIC EXERCISES: CPT

## 2021-10-08 PROCEDURE — 2580000003 HC RX 258: Performed by: NURSE ANESTHETIST, CERTIFIED REGISTERED

## 2021-10-08 PROCEDURE — 7100000011 HC PHASE II RECOVERY - ADDTL 15 MIN: Performed by: INTERNAL MEDICINE

## 2021-10-08 PROCEDURE — 88305 TISSUE EXAM BY PATHOLOGIST: CPT

## 2021-10-08 PROCEDURE — 2500000003 HC RX 250 WO HCPCS: Performed by: NURSE ANESTHETIST, CERTIFIED REGISTERED

## 2021-10-08 PROCEDURE — 2580000003 HC RX 258: Performed by: INTERNAL MEDICINE

## 2021-10-08 PROCEDURE — 1200000000 HC SEMI PRIVATE

## 2021-10-08 PROCEDURE — 0DB58ZX EXCISION OF ESOPHAGUS, VIA NATURAL OR ARTIFICIAL OPENING ENDOSCOPIC, DIAGNOSTIC: ICD-10-PCS | Performed by: INTERNAL MEDICINE

## 2021-10-08 PROCEDURE — 0DB68ZX EXCISION OF STOMACH, VIA NATURAL OR ARTIFICIAL OPENING ENDOSCOPIC, DIAGNOSTIC: ICD-10-PCS | Performed by: INTERNAL MEDICINE

## 2021-10-08 PROCEDURE — 80069 RENAL FUNCTION PANEL: CPT

## 2021-10-08 PROCEDURE — 3700000000 HC ANESTHESIA ATTENDED CARE: Performed by: INTERNAL MEDICINE

## 2021-10-08 PROCEDURE — 2709999900 HC NON-CHARGEABLE SUPPLY: Performed by: INTERNAL MEDICINE

## 2021-10-08 PROCEDURE — 36415 COLL VENOUS BLD VENIPUNCTURE: CPT

## 2021-10-08 PROCEDURE — 2700000000 HC OXYGEN THERAPY PER DAY

## 2021-10-08 PROCEDURE — 97530 THERAPEUTIC ACTIVITIES: CPT

## 2021-10-08 PROCEDURE — 3700000001 HC ADD 15 MINUTES (ANESTHESIA): Performed by: INTERNAL MEDICINE

## 2021-10-08 RX ORDER — LIDOCAINE HYDROCHLORIDE 20 MG/ML
INJECTION, SOLUTION INFILTRATION; PERINEURAL PRN
Status: DISCONTINUED | OUTPATIENT
Start: 2021-10-08 | End: 2021-10-08 | Stop reason: SDUPTHER

## 2021-10-08 RX ORDER — SODIUM CHLORIDE, SODIUM LACTATE, POTASSIUM CHLORIDE, CALCIUM CHLORIDE 600; 310; 30; 20 MG/100ML; MG/100ML; MG/100ML; MG/100ML
INJECTION, SOLUTION INTRAVENOUS CONTINUOUS PRN
Status: DISCONTINUED | OUTPATIENT
Start: 2021-10-08 | End: 2021-10-08 | Stop reason: SDUPTHER

## 2021-10-08 RX ORDER — PROPOFOL 10 MG/ML
INJECTION, EMULSION INTRAVENOUS PRN
Status: DISCONTINUED | OUTPATIENT
Start: 2021-10-08 | End: 2021-10-08 | Stop reason: SDUPTHER

## 2021-10-08 RX ADMIN — SODIUM CHLORIDE, PRESERVATIVE FREE 10 ML: 5 INJECTION INTRAVENOUS at 10:08

## 2021-10-08 RX ADMIN — SODIUM CHLORIDE, SODIUM LACTATE, POTASSIUM CHLORIDE, AND CALCIUM CHLORIDE: .6; .31; .03; .02 INJECTION, SOLUTION INTRAVENOUS at 12:50

## 2021-10-08 RX ADMIN — SODIUM CHLORIDE, PRESERVATIVE FREE 10 ML: 5 INJECTION INTRAVENOUS at 19:41

## 2021-10-08 RX ADMIN — PROPOFOL 30 MG: 10 INJECTION, EMULSION INTRAVENOUS at 13:03

## 2021-10-08 RX ADMIN — LIDOCAINE HYDROCHLORIDE 80 MG: 20 INJECTION, SOLUTION INFILTRATION; PERINEURAL at 12:57

## 2021-10-08 RX ADMIN — PROPOFOL 30 MG: 10 INJECTION, EMULSION INTRAVENOUS at 12:57

## 2021-10-08 RX ADMIN — MORPHINE SULFATE 2 MG: 2 INJECTION, SOLUTION INTRAMUSCULAR; INTRAVENOUS at 02:12

## 2021-10-08 RX ADMIN — Medication: at 05:48

## 2021-10-08 RX ADMIN — PROPOFOL 20 MG: 10 INJECTION, EMULSION INTRAVENOUS at 12:59

## 2021-10-08 RX ADMIN — MORPHINE SULFATE 2 MG: 2 INJECTION, SOLUTION INTRAMUSCULAR; INTRAVENOUS at 10:03

## 2021-10-08 ASSESSMENT — PAIN SCALES - WONG BAKER
WONGBAKER_NUMERICALRESPONSE: 0

## 2021-10-08 ASSESSMENT — PAIN SCALES - GENERAL
PAINLEVEL_OUTOF10: 0
PAINLEVEL_OUTOF10: 0
PAINLEVEL_OUTOF10: 5
PAINLEVEL_OUTOF10: 0
PAINLEVEL_OUTOF10: 7
PAINLEVEL_OUTOF10: 0
PAINLEVEL_OUTOF10: 0
PAINLEVEL_OUTOF10: 9

## 2021-10-08 NOTE — PROGRESS NOTES
Hospitalist Progress Note    Date of Admission: 10/3/2021    Chief Complaint: Fatigue    Hospital Course:                         78 y.o. male who presented to [facility] with past medical history of depression, hypertension, hyperlipidemia, osteoarthritis, Sophie Penta, type 2 diabetes, GERD presented to the ED with chief complaint of fatigue. Patient reported fatigue has been going on for over a month progressively worsening does have caregivers at home. Patient reports he lives alone does require assistance. Patient also reports that he has chronic vertigo and dizziness however he does take meclizine. Patient reports that he has been progressively getting worse and not feeling well and has been having constipation as well. With decreased appetite. Patient denied ever having an EGD done nor prior history of cancer. CODE STATUS discussed and the patient proceeded with full code. Subjective: EGD completed, showing esophageal ulceration/mass. Biopsies pending. He feels well with no chest pain, nausea, vomiting. Labs:   No results for input(s): WBC, HGB, HCT, PLT in the last 72 hours. Recent Labs     10/06/21  0456 10/06/21  0456 10/06/21  1516 10/07/21  0539 10/08/21  0538      < > 137 136 141   K 3.6  --  3.6 3.5 3.9      < > 101 101 102   CO2 26   < > 28 26 26   BUN 20   < > 21* 25* 33*   CREATININE 0.6*   < > 0.6* <0.5* 0.6*   CALCIUM 10.2   < > 9.9 9.5 9.5   PHOS 1.6*  --   --  1.9* 1.7*    < > = values in this interval not displayed. No results for input(s): AST, ALT, BILIDIR, BILITOT, ALKPHOS in the last 72 hours. No results for input(s): INR in the last 72 hours. Physical Exam Performed:    /78   Pulse 105   Temp 98 °F (36.7 °C) (Oral)   Resp 18   Ht 5' 8\" (1.727 m)   Wt 169 lb 1.5 oz (76.7 kg)   SpO2 92%   BMI 25.71 kg/m²     General appearance: No apparent distress, appears stated age and cooperative. HEENT: Pupils equal, round, and reactive to light. Conjunctivae/corneas clear. Neck: Supple, no jugular venous distention. Trachea midline with full range of motion. Respiratory:  Normal respiratory effort. Clear to auscultation, bilaterally without Rales/Wheezes/Rhonchi. Cardiovascular: Regular rate and rhythm with normal S1/S2 without murmurs, rubs or gallops. Abdomen: Soft, non-tender, non-distended with normal bowel sounds. Musculoskelatal: No clubbing, cyanosis. LUE swelling with focal erythema over AC. RUE mild swelling. Full range of motion without deformity. Neurologic:  Neurovascularly intact without any focal sensory/motor deficits. Cranial nerves: II-XII intact, grossly non-focal.  Psychiatric: Alert and oriented, thought content appropriate, normal insight  Skin: Skin color, texture, turgor normal.  No rashes or lesions. Capillary Refill: Brisk,< 3 seconds   Peripheral Pulses: +2 palpable, equal bilaterally       Assessment/Plan:    Active Hospital Problems    Diagnosis     Hyponatremia [E87.1]      Acute hypoxic respiratory failure; etiology unclear, could be edema  CTA negative for PE  Noted pulmonary congestion vs lymphangitic spread  Echocardiogram showed normal LVEF  Ruled out COVID; rapid negative and PCR negative  Stable. Wean O2 as tolerated    Esophageal Mass, rule out Esophageal CA  Imaging raises suspicion for pulmonary edema vs lymphangitic spread, possible liver and splenic metastasis. He reports a long standing history of GERD symptoms but does not recall ever having an EGD. GI consulted; EGD 10/8, showing esophageal ulceration/mass. Biopsies pending   Oncology following     Acute hyponatremia:  Nephrology following  Improving  Monitor off IVF    Hypomagnesemia, severe:   Resolved with supplementation    Hypercalcemia: Suspected malignancy  PTH low  Zoledronic acid given  IVF and Lasix  Improved    Left common iliac: Severe narrowing evident on CT  Asymptomatic    Diabetes Mellitus, Type 2: Controlled. Hold home regimen.  Monitor blood sugar and adjust regimen as needed. Diabetic (Carb-controlled) diet. DVT Prophylaxis: Lovenox  Diet: Diet NPO Exceptions are: Ice Chips, Sips of Water with Meds  Code Status: Full Code  PT/OT Eval Status: NA    Dispo - Unclear. Repeat SLP eval and advance diet as tolerated. Await Oncology input on any additional testing needed while here. Otherwise, not sure he needs to wait until Monday/Tuesday for biopsy results.      Disha Acosta MD

## 2021-10-08 NOTE — PLAN OF CARE
Pt has an absence of new skin breakdown. Preventative meplex is in place on sacrum. Pt turns self well, pt was also encouraged to turn. Pt is checked for incontinence, clean and dry at this time. Will continue to monitor.   Problem: Skin Integrity:  Goal: Will show no infection signs and symptoms  Description: Will show no infection signs and symptoms  Outcome: Ongoing  Goal: Absence of new skin breakdown  Description: Absence of new skin breakdown  Outcome: Ongoing

## 2021-10-08 NOTE — ANESTHESIA POSTPROCEDURE EVALUATION
Department of Anesthesiology  Postprocedure Note    Patient: Mimi Mas  MRN: [id number]  YOB: 1945  Date of evaluation: 10/8/2021  Time:  2:06 PM     Procedure Summary     Date: 10/08/21 Room / Location: [facility]    Anesthesia Start: 1250 Anesthesia Stop: 4108    Procedure: EGD BIOPSY (N/A ) Diagnosis: (abnormal CT)    Surgeons: Khari Pacheco MD Responsible Provider: Kingsley Cuevas MD    Anesthesia Type: general ASA Status: 3          Anesthesia Type: general    Zhou Phase I:      Zhou Phase II: Zhou Score: 9    Last vitals: Reviewed and per EMR flowsheets.        Anesthesia Post Evaluation    Patient location during evaluation: PACU  Patient participation: complete - patient participated  Level of consciousness: awake and alert  Pain score: 0  Airway patency: patent  Nausea & Vomiting: no nausea and no vomiting  Complications: no  Cardiovascular status: blood pressure returned to baseline  Respiratory status: acceptable  Hydration status: stable

## 2021-10-08 NOTE — PROGRESS NOTES
Pt transferred to  328 per stretcher and transporter. Pt on portable Eddie@Richard Pauer - 3P.   In stable condition

## 2021-10-08 NOTE — OP NOTE
Operative Note      Patient: Augusta Mir  YOB: 1945  MRN: [id number]    Date of Procedure: 10/8/2021    Pre-Op Diagnosis: abnormal CT    Post-Op Diagnosis: Suspicious long distal esophageal lesion extending from 33cm to 40cm. HH. Gastritis. Procedure(s):  EGD BIOPSY    Surgeon(s):  Madison Warner MD    Assistant:   * No surgical staff found *    Anesthesia: General    Estimated Blood Loss (mL): Minimal    Complications: None    Specimens:   ID Type Source Tests Collected by Time Destination   A :  Tissue Biopsy SURGICAL PATHOLOGY Madison Warner MD 10/8/2021 1259    B :  Tissue Biopsy SURGICAL PATHOLOGY Madison Warner MD 10/8/2021 1301    C :  Tissue Biopsy SURGICAL PATHOLOGY Madison Warner MD 10/8/2021 1305        Implants:  * No implants in log *      Drains:   Urethral Catheter Non-latex 16 fr (Active)   Catheter Indications Urinary retention (acute or chronic), continuous bladder irrigation or bladder outlet obstruction 10/08/21 1005   Securement Device Date Changed 10/06/21 10/08/21 100   Site Assessment No urethral drainage 10/08/21 1005   Urine Color Sudha;Tea 10/08/21 1005   Urine Appearance Mucous 10/08/21 1005   Urine Odor Malodorous 10/07/21 2045   Output (mL) 200 mL 10/08/21 1057       [REMOVED] Closed/Suction Drain Left Abdomen Bulb 10 Mexican (Removed)     [facility]     [facility] [address],  [address] [facility]  Phone: [phone]  [address] [place] [address] [address], [address]  [place], [address] [place] [address]  Phone: [phone]    EGD Procedure Note    Patient: Augusta Mir  : 1945    Procedure: Esophagogastroduodenoscopy with Bx    Date:  10/8/2021     Endoscopist:  Madison Warner MD, MD    Referring Physician:  SUDHIR Villasenor - CNP    Anesthesia: Anesthesia: MAC    Procedure Details  The patient was placed in the left lateral decubitus position. A bite block was placed.  The patient was monitored with ECG tracing, pulse oximetry, blood pressure monitoring, and direct observation. An upper endoscope was inserted through the bite into the mouth and advanced under direct vision into the esophagus and gradually to the duodenum. A careful inspection was made during the procedure including a retroflexed views of the proximal stomach and cardia and of the incisura. The findings and interventions are described below. Findings:     Suspicious long distal esophageal lesion extending from 33cm to 40cm with a retained esophageal content above it and in the upper part of the lesionthe secretions in these areas were suctioned. It did allow the passage of the scope with only minimal resistance. The shelf was between 32 and 33 cm. Biopsies were taken from the lesion and just above the shelf and 2 separate jars. The lesion consisted of extensive ulceration, polypoid areas of growth, friability and narrowing of the lumen. A sliding hiatal hernia was noted extending from the GE junction located at 40cm to 42cm where diaphragmatic impression was seen. Stomach:   Diffuse mild non-erosive antral and body gastritis with thickened rugal folds in the upper stomach- Bxed  On retroverted view Hill  type II sliding hiatal hernia was noted. Duodenum: Normal mucosa    Complications/ adverse events:  None. Estimated Blood loss:  <10 ml    Disposition:   PACU - hemodynamically stable. Discharge from PACU  after appropriate period of observation and when criteria for discharge are met. Recommendations:  Continue acid suppressive therapy and anti-reflux measures and life-style changes. Avoid irritants to the stomach such as NSAIDs and aspirin. Await pathology report. Once it is available, interpretation and further recommendations will be sent. The patient should be seen oncologist once the path confirms malignancy. IMPORTANT: Please note that some portions of this note may have been created using Dragon voice recognition software.  Some \"sound-alike\" and totally wrong word substitutions may have taken place due to known inherent limitations of any such software, including this voice recognition software. In spite of efforts to eliminate such errors, some may not have been corrected. So please read the note with this in mind and recognize such mistakes and understand the correct version using the  context. If there are still uncertainties in the mind of the medical provider reading this note about any aspect of the note, the provider can feel free to contact me. Thanks.     Electronically signed by Madison Warner MD on 10/8/2021 at 1:09 PM

## 2021-10-08 NOTE — ANESTHESIA PRE PROCEDURE
Department of Anesthesiology  Preprocedure Note       Name:  Jonathon Wiley   Age:  78 y.o.  :  1945                                          MRN:  [id number]         Date:  10/8/2021      Surgeon: Benedict Meyer):  Sanjeev Gann MD    Procedure: Procedure(s):  EGD ESOPHAGOGASTRODUODENOSCOPY    Medications prior to admission:   Prior to Admission medications    Medication Sig Start Date End Date Taking?  Authorizing Provider   docusate sodium (COLACE) 100 MG capsule  21  Yes Historical Provider, MD   clopidogrel (PLAVIX) 75 MG tablet TAKE 1 TABLET ONCE DAILY 20  Yes SUDHIR Hurst CNP   amLODIPine (NORVASC) 5 MG tablet TAKE 1 TABLET BY MOUTH EVERY DAY 20  Yes SUDHIR Hurst CNP   fenofibrate (TRICOR) 145 MG tablet TAKE 1 TABLET BY MOUTH EVERY DAY 20  Yes SUDHIR Hurst CNP   lisinopril (PRINIVIL;ZESTRIL) 30 MG tablet TALE 1 TABLET BY MOUTH EVERY DAY 20  Yes SUDHIR Hurst CNP   TRADJENTA 5 MG tablet TAKE 1 TABLET BY MOUTH EVERY DAY 20  Yes SUDHIR Hurst CNP   BYSTOLIC 10 MG tablet TAKE 1 TABLET BY MOUTH EVERY DAY 20  Yes SUDHIR Hurst CNP   atorvastatin (LIPITOR) 10 MG tablet TAKE 1 TABLET BY MOUTH EVERY DAY 20  Yes SUDHIR Hurst CNP   glipiZIDE (GLUCOTROL) 5 MG tablet TAKE ONE TABLET BY MOUTH DAILY 20  Yes SUDHIR Hurst CNP   esomeprazole ([address]) 40 MG delayed release capsule TAKE 1 CAPSULE ONCE DAILY 20  Yes SUDHIR Hurst CNP   vitamin D (ERGOCALCIFEROL) 1.25 MG (41922 UT) CAPS capsule TAKE 1 CAPSULE ONCE A WEEK 20  Yes SUDHIR Hurst CNP   esomeprazole Magnesium (NEXIUM) 40 MG PACK Take 1 packet by mouth daily 20  Yes SUDHIR Hurst CNP   meclizine (ANTIVERT) 25 MG CHEW  21   Historical Provider, MD       Current medications:    Current Facility-Administered Medications   Medication Dose Route Frequency Provider Last Rate Last Admin    morphine (PF) injection 2 mg  2 mg IntraVENous Q4H PRN SUDHIR Walls - NP   2 mg at 10/08/21 1003    HYDROcodone-acetaminophen (NORCO) 5-325 MG per tablet 1 tablet  1 tablet Oral Q4H PRN Judi Abarca MD   1 tablet at 10/07/21 1052    phosphorus (K PHOS NEUTRAL) tablet 2 tablet  500 mg Oral TID Susan Brown MD   2 tablet at 10/06/21 2059    atorvastatin (LIPITOR) tablet 10 mg  10 mg Oral Daily Ahmad MAGNO Lizet, DO   10 mg at 10/06/21 1610    fenofibrate (TRIGLIDE) tablet 160 mg  160 mg Oral Daily Ahmad A Lizet, DO   160 mg at 10/06/21 1610    pantoprazole (PROTONIX) tablet 40 mg  40 mg Oral QAM AC Ahmad MAGNO Lizet, DO   40 mg at 10/06/21 1610    sodium chloride flush 0.9 % injection 10 mL  10 mL IntraVENous 2 times per day Batsheva Hinds, DO   10 mL at 10/08/21 1008    sodium chloride flush 0.9 % injection 10 mL  10 mL IntraVENous PRN Milla KIRAN Lizet, DO        0.9 % sodium chloride infusion  25 mL IntraVENous PRN Soledad Hinds, DO        potassium chloride 10 mEq/100 mL IVPB (Peripheral Line)  10 mEq IntraVENous PRN Bailee Condon, DO        magnesium sulfate 2000 mg in 50 mL IVPB premix  2,000 mg IntraVENous PRN Baileeashok Condon, DO   Stopped at 10/06/21 1317    promethazine (PHENERGAN) tablet 12.5 mg  12.5 mg Oral Q6H PRN Milla Cooperzi, DO        Or    ondansetron (ZOFRAN) injection 4 mg  4 mg IntraVENous Q6H PRN Batsheva KIRAN Lizet, DO        acetaminophen (TYLENOL) tablet 650 mg  650 mg Oral Q6H PRN Arianned A Lizet, DO        Or    acetaminophen (TYLENOL) suppository 650 mg  650 mg Rectal Q6H PRN Arianned A Lizet, DO        docusate sodium (COLACE) capsule 100 mg  100 mg Oral Daily Judi Abarca MD   100 mg at 10/06/21 1611       Allergies:     Allergies   Allergen Reactions    Rosuvastatin      Other reaction(s): Muscle Aches       Problem List:    Patient Active Problem List   Diagnosis Code    Degeneration of cervical intervertebral disc M50.30    Degeneration of lumbar or lumbosacral intervertebral disc M51.37    Lumbar radiculopathy M54.16    Cervical radiculopathy, chronic M54.12    Brain TIA G45.9    Type 2 diabetes mellitus without complication, without long-term current use of insulin (HCC) E11.9    Mixed hyperlipidemia E78.2    Essential hypertension I10    Gastroesophageal reflux disease without esophagitis K21.9    Cerebrovascular accident (CVA) ([facility] I63.9    Iron deficiency anemia secondary to inadequate dietary iron intake D50.8    Colonoscopy refused Z53.20    Hyponatremia E87.1       Past Medical History:        Diagnosis Date    Cerebral artery occlusion with cerebral infarction ([facility]     Depression     Diabetes mellitus ([facility]     Diverticulitis     Fatty liver     GERD (gastroesophageal reflux disease)     Hyperlipidemia     Hypertension     Osteoarthritis        Past Surgical History:        Procedure Laterality Date    HERNIA REPAIR      LAPAROTOMY  06/14/2018    with dr Tiny Reyna 6/14/18       Social History:    Social History     Tobacco Use    Smoking status: Current Every Day Smoker     Packs/day: 1.00     Years: 59.00     Pack years: 59.00     Types: Cigarettes    Smokeless tobacco: Never Used   Substance Use Topics    Alcohol use: Not Currently     Comment: States hasn't drank in eight days                                Ready to quit: Not Answered  Counseling given: Not Answered      Vital Signs (Current):   Vitals:    10/07/21 2319 10/08/21 0359 10/08/21 0400 10/08/21 1005   BP: 117/68 132/78  136/84   Pulse: 91 103 105 108   Resp: 18 18  22   Temp: 98.5 °F (36.9 °C) 98 °F (36.7 °C)  98.2 °F (36.8 °C)   TempSrc: Oral Oral  Oral   SpO2: 92% 92%  92%   Weight:   169 lb 1.5 oz (76.7 kg)    Height:                                                  BP Readings from Last 3 Encounters:   10/08/21 136/84   01/23/20 138/88   09/23/19 (!) 160/72       NPO Status:                                                                                 BMI:   Wt Readings from Last 3 Encounters: 10/08/21 169 lb 1.5 oz (76.7 kg)   01/23/20 206 lb 8 oz (93.7 kg)   09/23/19 207 lb 4 oz (94 kg)     Body mass index is 25.71 kg/m².     CBC:   Lab Results   Component Value Date    WBC 5.6 10/05/2021    RBC 4.31 10/05/2021    HGB 13.7 10/05/2021    HCT 39.0 10/05/2021    MCV 90.6 10/05/2021    RDW 13.0 10/05/2021     10/05/2021       CMP:   Lab Results   Component Value Date     10/08/2021    K 3.9 10/08/2021    K 3.6 10/06/2021     10/08/2021    CO2 26 10/08/2021    BUN 33 10/08/2021    CREATININE 0.6 10/08/2021    GFRAA >60 10/08/2021    AGRATIO 1.5 10/03/2021    LABGLOM >60 10/08/2021    GLUCOSE 102 10/08/2021    PROT 5.7 10/03/2021    CALCIUM 9.5 10/08/2021    BILITOT 0.9 10/03/2021    ALKPHOS 42 10/03/2021    AST 16 10/03/2021    ALT 12 10/03/2021       POC Tests:   Recent Labs     10/08/21  1146   POCGLU 112*       Coags: No results found for: PROTIME, INR, APTT    HCG (If Applicable): No results found for: PREGTESTUR, PREGSERUM, HCG, HCGQUANT     ABGs: No results found for: PHART, PO2ART, HRL8GJJ, TBL6BUC, BEART, L7ACKMHS     Type & Screen (If Applicable):  No results found for: LABABO, LABRH    Drug/Infectious Status (If Applicable):  No results found for: HIV, HEPCAB    COVID-19 Screening (If Applicable):   Lab Results   Component Value Date    COVID19 Not Detected 10/04/2021           Anesthesia Evaluation  Patient summary reviewed and Nursing notes reviewed  Airway: Mallampati: I  TM distance: >3 FB   Neck ROM: full  Mouth opening: > = 3 FB Dental: normal exam         Pulmonary:Negative Pulmonary ROS   (+) decreased breath sounds,                             Cardiovascular:    (+) hypertension:, murmur,         Rhythm: regular  Rate: normal                    Neuro/Psych:   (+) CVA:, TIA, psychiatric history:            GI/Hepatic/Renal:   (+) GERD:, liver disease:,           Endo/Other:    (+) DiabetesType II DM, , .                 Abdominal:             Vascular: negative vascular ROS.         Other Findings:             Anesthesia Plan      general     ASA 3       Induction: intravenous. MIPS: Postoperative opioids intended and Prophylactic antiemetics administered. Anesthetic plan and risks discussed with patient. Plan discussed with CRNA.                   Nino Bethea MD   10/8/2021

## 2021-10-08 NOTE — PROGRESS NOTES
Physical Therapy  Facility/Department: [facility] C3 TELE/MED SURG/ONC  Daily Treatment Note  NAME: Tyrone Nieto  : 1945  MRN: [id number]    Date of Service: 10/8/2021    Discharge Recommendations:  Subacute/Skilled Nursing Facility   PT Equipment Recommendations  Equipment Needed: No  Other: Defer to facility    Assessment   Body structures, Functions, Activity limitations: Decreased functional mobility ; Decreased strength;Decreased cognition;Decreased endurance;Decreased balance;Decreased posture;Decreased safe awareness; Increased pain  Assessment: Pt progress in therapy limited by pain and fluctuation in vitals this session. Pt able to perform LE therex and presented with improved SpO2 with mobility and sitting EOB however with reports of light headedness with standing and LOB posterior requiring Levon to stabilize and return to sitting. Pt would continue to benefit from skilled therapy to address impairments and maximize safety. Rec SNF at d/c. Treatment Diagnosis: Generalized weakness, decreased (I) with mobility  Specific instructions for Next Treatment: Progress ther ex and mobility as tolerated  Prognosis: Good;Fair  Decision Making: Medium Complexity  PT Education: Goals; General Safety;PT Role;Disease Specific Education;Plan of Care; Functional Mobility Training;Home Exercise Program;Precautions;Transfer Training  Patient Education: Pt educated on importance of mobility, safety with transfers, safety with use of RW, and importance of assessing BP with change in position. Pt reports understanding but will require reinforcement  Barriers to Learning: Impaired cognition/delayed processing  REQUIRES PT FOLLOW UP: Yes  Activity Tolerance  Activity Tolerance: Patient Tolerated treatment well;Patient limited by fatigue;Patient limited by pain  Activity Tolerance: At start of session. BP: 128/82 mmHg,  bpm, SpO2:90%. SpO2 elevated to 93% with LE mobility and upright sitting.  Pt HR fluctuating throughout from 130's to 100's with increased pain. Pt also reports light headedness with change in position. BP seated 145/88 mmHg, after stand 132/85 mmHg. Rn informed     Patient Diagnosis(es): The primary encounter diagnosis was Hypercalcemia. Diagnoses of Hyponatremia, Hypoxia, Hypoglycemia, Elevated troponin, Lactic acidosis, Abnormal CT scan, esophagus, Abnormal CT scan of lung, Lesion of spleen, Liver lesion, and Abdominal lymphadenopathy were also pertinent to this visit. has a past medical history of Cerebral artery occlusion with cerebral infarction ([address] Depression, Diabetes mellitus ([address] Diverticulitis, Fatty liver, GERD (gastroesophageal reflux disease), Hyperlipidemia, Hypertension, and Osteoarthritis. has a past surgical history that includes hernia repair and laparotomy (06/14/2018). Restrictions  Restrictions/Precautions  Restrictions/Precautions: General Precautions, Fall Risk  Position Activity Restriction  Other position/activity restrictions: High fall risk per nursing assessment, up with assistance, 3L O2, issa  Subjective   General  Chart Reviewed: Yes  Response To Previous Treatment: Not applicable  Family / Caregiver Present: No  Referring Practitioner: Dr. Lalo Solis  Subjective  Subjective: Pt received in bed and agreeable to PT session. Pt wanting to get moving. General Comment  Comments: Pt resting in bed upon entry of therapy staff, RN cleared for therapy. Pain Screening  Patient Currently in Pain: Yes  Pain Assessment  Pain Level: 5  Vital Signs  Patient Currently in Pain: Yes       Orientation  Orientation  Overall Orientation Status: Within Normal Limits (However, disorganized thought, rambling throughout session, fluctuation in behavior, reporting confusion with waking)  Cognition   Cognition  Overall Cognitive Status: Exceptions  Arousal/Alertness: Delayed responses to stimuli  Following Commands: Follows one step commands with repetition; Follows one step commands with increased time  Attention Span: Attends with cues to redirect  Safety Judgement: Decreased awareness of need for safety;Decreased awareness of need for assistance  Problem Solving: Assistance required to correct errors made;Assistance required to identify errors made;Assistance required to implement solutions  Insights: Decreased awareness of deficits  Initiation: Requires cues for some  Sequencing: Requires cues for some  Cognition Comment: lack of attention and insight, fluctuating behavior throughout. Objective   Bed mobility  Supine to Sit: Stand by assistance (HOB elevated with bed rail)  Sit to Supine: Stand by assistance (HOB elevated with bed rail)  Transfers  Sit to Stand: Minimal Assistance (with RW)  Stand to sit: Contact guard assistance (with RW)  Comment: Pt required cues for hand placement for safety        Balance  Posture: Fair  Sitting - Static: Good  Sitting - Dynamic: Fair;+  Standing - Static: Fair  Standing - Dynamic: Fair  Comments: Pt with posterior lean with static standing d/t dizzness and light headedness. Pt required assist to return to sitting.   Exercises  Heelslides: x10 BLE  Gluteal Sets: x10 3 sec hold  Hip Flexion: Standing marches x5 BLE with RW and Levon for stability  Hip Abduction: Supine x10 BLE  Ankle Pumps: x10 BLE     AM-PAC Score  AM-PAC Inpatient Mobility Raw Score : 17 (10/08/21 1052)  AM-PAC Inpatient T-Scale Score : 42.13 (10/08/21 1052)  Mobility Inpatient CMS 0-100% Score: 50.57 (10/08/21 1052)  Mobility Inpatient CMS G-Code Modifier : CK (10/08/21 1052)          Goals  Short term goals  Time Frame for Short term goals: 1 week, 10/14/21 (unless otherwise specified)  Short term goal 1: Pt will transfer supine <-> sit with supervision, 10/8: SBA HOB elevated and bed rail  Short term goal 2: Pt will transfer sit <-> stand and bed>chair using least AD with supervision, 10/8 Levon with RW  Short term goal 3: Pt will ambulate x 50 feet using least AD with CGA x 1 10/8: NT  Short term goal 4: By 10/10/21: Pt will tolerate 12-15 reps BLE exercise for strengthening, balance, and endurance 10/8: 10 reps  Patient Goals   Patient goals : \"To get stronger\"    Plan    Plan  Times per week: 3-5x/week in acute care  Times per day: Daily  Specific instructions for Next Treatment: Progress ther ex and mobility as tolerated  Current Treatment Recommendations: Strengthening, Balance Training, Endurance Training, Functional Mobility Training, Transfer Training, Gait Training, Safety Education & Training, Patient/Caregiver Education & Training, Equipment Evaluation, Education, & procurement, Neuromuscular Re-education, Home Exercise Program, Pain Management, Stair training  Safety Devices  Type of devices: All fall risk precautions in place, Call light within reach, Nurse notified, Gait belt, Patient at risk for falls, Bed alarm in place, Left in bed     Therapy Time   Individual Concurrent Group Co-treatment   Time In 0813         Time Out 0855         Minutes 42         Timed Code Treatment Minutes: 42 Minutes     If pt is unable to be seen after this session, please let this note serve as discharge summary. Please see case management note for discharge disposition. Thank you.     Tavares Bassett, PT

## 2021-10-08 NOTE — PROGRESS NOTES
Speech Language Pathology  Attempt Note    SLP attempted to see pt for dysphagia f/u. Pt currently NPO for EGD this date. SLP to re-attempt at pt's schedule and condition allows. No charges.     Thank you,    Amaya Pinto MA [phone] [facility]  Speech Language Pathologist

## 2021-10-08 NOTE — CARE COORDINATION
Hospital day 4: Patient on C3 re hyponatremia followed by IM, Hematology/Oncology, Nephrology, and GI. Patient with SNF recs and reviewed with Patient. Patient in agreement does not feel would be safe to go home at current level of function, but does not feel can select a location at this time. Writer left copy of SNF list with Patient. Writer offered to reach out to family ie cousin Rimma Chinchilla, declined to have writer do so at this time. Patient reporting capable of making own choices, encouraged to pick 1st and 2nd choice by end of day so can assist with making referrals. Patient appears anxious re anticipated procedure this date ( EGD). SW offered active listening an support. [place]  1500: Writer met with PAtietn in room is agreeable to referral to OVM, writer placed call confirmed receipt, pending at this time. [place]

## 2021-10-08 NOTE — PROGRESS NOTES
Pt assessment completed and charted. VSS. Pt a/o x4. Pt has episodes of confusion/forgetfulness but is easily reoriented and can answer all orientation questions. Pt reporting SOB r/t increase in his pain. Prn pain medication given per mar. Pt encouraged to turn. Bed check active for safety. Pt is having a lot of trouble taking anything PO, PO medications were held. Bed in lowest position and wheels locked. Call light within reach. Bedside table within reach. Non-skid footwear in place. Pt denies any other needs at this time. Pt calls out appropriately. Will continue to monitor.

## 2021-10-08 NOTE — PROGRESS NOTES
Pt assessment completed and charted. VSS. Pt a/ox4 with episodes of confusion. Confusion seems to be worse/more frequent at night. Pt tolerating full liquids with minimal to no coughing when drinking water. Pt does not want PO meds, stating it hurts to swallow. Will encourage PO intake. Pt states that pain is okay right now. Bed check active for safety. Bed in lowest position and wheels locked. Call light within reach. Bedside table within reach. Non-skid footwear in place. Pt denies any other needs at this time. Pt calls out appropriately. Will continue to monitor.

## 2021-10-09 LAB
ALBUMIN SERPL-MCNC: 2.8 G/DL (ref 3.4–5)
ANION GAP SERPL CALCULATED.3IONS-SCNC: 10 MMOL/L (ref 3–16)
BUN BLDV-MCNC: 34 MG/DL (ref 7–20)
CALCIUM SERPL-MCNC: 9.4 MG/DL (ref 8.3–10.6)
CHLORIDE BLD-SCNC: 99 MMOL/L (ref 99–110)
CO2: 27 MMOL/L (ref 21–32)
CREAT SERPL-MCNC: <0.5 MG/DL (ref 0.8–1.3)
GFR AFRICAN AMERICAN: >60
GFR NON-AFRICAN AMERICAN: >60
GLUCOSE BLD-MCNC: 112 MG/DL (ref 70–99)
GLUCOSE BLD-MCNC: 119 MG/DL (ref 70–99)
GLUCOSE BLD-MCNC: 121 MG/DL (ref 70–99)
GLUCOSE BLD-MCNC: 123 MG/DL (ref 70–99)
GLUCOSE BLD-MCNC: 128 MG/DL (ref 70–99)
MAGNESIUM: 1.7 MG/DL (ref 1.8–2.4)
PERFORMED ON: ABNORMAL
PHOSPHORUS: 1.7 MG/DL (ref 2.5–4.9)
POTASSIUM SERPL-SCNC: 3.8 MMOL/L (ref 3.5–5.1)
SODIUM BLD-SCNC: 136 MMOL/L (ref 136–145)

## 2021-10-09 PROCEDURE — 80069 RENAL FUNCTION PANEL: CPT

## 2021-10-09 PROCEDURE — 6360000002 HC RX W HCPCS: Performed by: INTERNAL MEDICINE

## 2021-10-09 PROCEDURE — 6370000000 HC RX 637 (ALT 250 FOR IP): Performed by: INTERNAL MEDICINE

## 2021-10-09 PROCEDURE — 2580000003 HC RX 258: Performed by: INTERNAL MEDICINE

## 2021-10-09 PROCEDURE — 83735 ASSAY OF MAGNESIUM: CPT

## 2021-10-09 PROCEDURE — 6360000002 HC RX W HCPCS: Performed by: FAMILY MEDICINE

## 2021-10-09 PROCEDURE — 6360000002 HC RX W HCPCS: Performed by: NURSE PRACTITIONER

## 2021-10-09 PROCEDURE — 2700000000 HC OXYGEN THERAPY PER DAY

## 2021-10-09 PROCEDURE — 1200000000 HC SEMI PRIVATE

## 2021-10-09 PROCEDURE — 94761 N-INVAS EAR/PLS OXIMETRY MLT: CPT

## 2021-10-09 PROCEDURE — 36415 COLL VENOUS BLD VENIPUNCTURE: CPT

## 2021-10-09 RX ORDER — MAGNESIUM SULFATE IN WATER 40 MG/ML
2000 INJECTION, SOLUTION INTRAVENOUS ONCE
Status: COMPLETED | OUTPATIENT
Start: 2021-10-09 | End: 2021-10-09

## 2021-10-09 RX ADMIN — SODIUM CHLORIDE, PRESERVATIVE FREE 10 ML: 5 INJECTION INTRAVENOUS at 11:01

## 2021-10-09 RX ADMIN — DOCUSATE SODIUM 100 MG: 100 CAPSULE, LIQUID FILLED ORAL at 10:56

## 2021-10-09 RX ADMIN — MAGNESIUM SULFATE HEPTAHYDRATE 2000 MG: 40 INJECTION, SOLUTION INTRAVENOUS at 16:52

## 2021-10-09 RX ADMIN — ONDANSETRON 4 MG: 2 INJECTION INTRAMUSCULAR; INTRAVENOUS at 18:18

## 2021-10-09 RX ADMIN — FENOFIBRATE 160 MG: 160 TABLET ORAL at 10:57

## 2021-10-09 RX ADMIN — ATORVASTATIN CALCIUM 10 MG: 10 TABLET, FILM COATED ORAL at 10:58

## 2021-10-09 RX ADMIN — MORPHINE SULFATE 2 MG: 2 INJECTION, SOLUTION INTRAMUSCULAR; INTRAVENOUS at 02:38

## 2021-10-09 ASSESSMENT — PAIN SCALES - WONG BAKER
WONGBAKER_NUMERICALRESPONSE: 0

## 2021-10-09 ASSESSMENT — PAIN SCALES - GENERAL: PAINLEVEL_OUTOF10: 9

## 2021-10-09 NOTE — PLAN OF CARE
Pt remains free from falls and physical injury during this shift. Will continue to monitor.   Problem: Falls - Risk of:  Goal: Will remain free from falls  Description: Will remain free from falls  Outcome: Ongoing  Goal: Absence of physical injury  Description: Absence of physical injury  Outcome: Ongoing

## 2021-10-09 NOTE — PROGRESS NOTES
Pt a/o with occasional confusion, vss, assessment complete and charted, no needs or complaints at this time, call light in reach

## 2021-10-09 NOTE — PROGRESS NOTES
Physician Progress Note      Keira Serrano  CSN #:                  [id number]  :                       1945  ADMIT DATE:       10/3/2021 9:01 PM  [address] [place] DATE:  RESPONDING  PROVIDER #:        Sarina Gutierrez MD          QUERY TEXT:    Pt admitted with acute hypoxic respiratory failure \"could be edema\". CXR   findings for \"suspected worsening of now severe pulmonary interstitial edema   with overlying multifocal alveolar edema. \"  If possible, please document in   the progress notes and discharge summary if you are evaluating and/or treating   any of the following: The medical record reflects the following:  Risk Factors: esophageal mass, rule out esophageal CA  Clinical Indicators: Acute hypoxic respiratory failure; etiology unclear,   could be edema  10/7 CXR: Suspected worsening of now severe pulmonary interstitial edema with   overlying  multifocal alveolar edema. supplemental oxygen at 4 ltr  Treatment: imaging, supplemental oxygen, IV lasix x 1, I/Os and weights per   nursing, supportive care    Thank you,  Vera Jones RN CDS  [phone]  Options provided:  -- [Noncardiogenic acute pulmonary edema  -- Acute pulmonary edema due to heart disease  -- Acute pulmonary edema due to heart failure  -- Other - I will add my own diagnosis  -- Disagree - Not applicable / Not valid  -- Disagree - Clinically unable to determine / Unknown  -- Refer to Clinical Documentation Reviewer    PROVIDER RESPONSE TEXT:    This patient has noncardiogenic acute pulmonary edema. Query created by:  Shemar Alcaraz on 10/8/2021 4:20 PM      Electronically signed by:  Sarina Gutierrez MD 10/9/2021 6:36 PM

## 2021-10-09 NOTE — PROGRESS NOTES
Hospitalist Progress Note    Date of Admission: 10/3/2021    Chief Complaint: Fatigue    Hospital Course:                         78 y.o. male who presented to [facility] with past medical history of depression, hypertension, hyperlipidemia, osteoarthritis, Marden Figures, type 2 diabetes, GERD presented to the ED with chief complaint of fatigue. Patient reported fatigue has been going on for over a month progressively worsening does have caregivers at home. Patient reports he lives alone does require assistance. Patient also reports that he has chronic vertigo and dizziness however he does take meclizine. Patient reports that he has been progressively getting worse and not feeling well and has been having constipation as well. With decreased appetite. Patient denied ever having an EGD done nor prior history of cancer. CODE STATUS discussed and the patient proceeded with full code. Subjective: Still with difficulty swallowing. Speech therapy states diet per GI recs. Feeling well. Labs:   No results for input(s): WBC, HGB, HCT, PLT in the last 72 hours. Recent Labs     10/07/21  0539 10/08/21  0538 10/09/21  0529    141 136   K 3.5 3.9 3.8    102 99   CO2 26 26 27   BUN 25* 33* 34*   CREATININE <0.5* 0.6* <0.5*   CALCIUM 9.5 9.5 9.4   PHOS 1.9* 1.7* 1.7*     No results for input(s): AST, ALT, BILIDIR, BILITOT, ALKPHOS in the last 72 hours. No results for input(s): INR in the last 72 hours. Physical Exam Performed:    /80   Pulse 102   Temp 97.6 °F (36.4 °C) (Axillary)   Resp 18   Ht 5' 8\" (1.727 m)   Wt 169 lb 1.5 oz (76.7 kg)   SpO2 92%   BMI 25.71 kg/m²     General appearance: No apparent distress, appears stated age and cooperative. HEENT: Pupils equal, round, and reactive to light. Conjunctivae/corneas clear. Neck: Supple, no jugular venous distention. Trachea midline with full range of motion. Respiratory:  Normal respiratory effort.  Clear to auscultation, bilaterally without Rales/Wheezes/Rhonchi. Cardiovascular: Regular rate and rhythm with normal S1/S2 without murmurs, rubs or gallops. Abdomen: Soft, non-tender, non-distended with normal bowel sounds. Musculoskelatal: No clubbing, cyanosis. LUE swelling with focal erythema over AC. RUE mild swelling. Full range of motion without deformity. Neurologic:  Neurovascularly intact without any focal sensory/motor deficits. Cranial nerves: II-XII intact, grossly non-focal.  Psychiatric: Alert and oriented, thought content appropriate, normal insight  Skin: Skin color, texture, turgor normal.  No rashes or lesions. Capillary Refill: Brisk,< 3 seconds   Peripheral Pulses: +2 palpable, equal bilaterally       Assessment/Plan:    Active Hospital Problems    Diagnosis     Hyponatremia [E87.1]      Acute hypoxic respiratory failure; etiology unclear, could be edema  CTA negative for PE  Noted pulmonary congestion vs lymphangitic spread  Echocardiogram showed normal LVEF  Ruled out COVID; rapid negative and PCR negative  Stable. Wean O2 as tolerated    Esophageal Mass, rule out Esophageal CA  Imaging raises suspicion for pulmonary edema vs lymphangitic spread, possible liver and splenic metastasis. He reports a long standing history of GERD symptoms but does not recall ever having an EGD. GI consulted; EGD 10/8, showing esophageal ulceration/mass. Biopsies pending   Oncology following     Acute hyponatremia:  Nephrology following  Improving  Monitor off IVF    Hypercalcemia: Suspected malignancy  PTH low  Zoledronic acid given  IVF and Lasix  Improved    Left common iliac: Severe narrowing evident on CT  Asymptomatic    Diabetes Mellitus, Type 2: Controlled. Hold home regimen. Monitor blood sugar and adjust regimen as needed. Diabetic (Carb-controlled) diet. Hypomagnesemia - replete. Repeat labs in am.    DVT Prophylaxis: Lovenox  Diet: Adult Oral Nutrition Supplement; Diabetic Oral Supplement  ADULT DIET;  Full Liquid  Code Status: Full Code  PT/OT Eval Status: NA    Dispo - Unclear. Speech defers diet to GI. Await Oncology input on any additional testing needed while here. Otherwise, not sure he needs to wait until Monday/Tuesday for biopsy results.      Mark Rivers MD

## 2021-10-09 NOTE — PROGRESS NOTES
Speech Language Pathology    Pt s/p EGD yesterday, 10/8. Pt now on full liquid diet. Per BSE completed on 10/7 with administration of ice chips and thin liquid trials only  \"eventually, pt reports that the liquids are stuck (pt pointing to mid-sternum), and then pt begins exhbiting wet belching with deep, wet coughing post swallow. Suspected regurgitation of po trials with aspiration after the swallow\". ST to defer diet recommendations to GI at this time. Noted further recommendations to follow pathology report received.     Josee Ojeda M.S. [facility]  Speech-language pathologist  [id number]  Speech Desk Phone: [phone]

## 2021-10-10 ENCOUNTER — APPOINTMENT (OUTPATIENT)
Dept: GENERAL RADIOLOGY | Age: 76
DRG: 374 | End: 2021-10-10
Payer: MEDICARE

## 2021-10-10 LAB
ALBUMIN SERPL-MCNC: 2.7 G/DL (ref 3.4–5)
ANION GAP SERPL CALCULATED.3IONS-SCNC: 7 MMOL/L (ref 3–16)
BUN BLDV-MCNC: 40 MG/DL (ref 7–20)
CALCIUM SERPL-MCNC: 9.2 MG/DL (ref 8.3–10.6)
CHLORIDE BLD-SCNC: 96 MMOL/L (ref 99–110)
CO2: 30 MMOL/L (ref 21–32)
CREAT SERPL-MCNC: 0.6 MG/DL (ref 0.8–1.3)
GFR AFRICAN AMERICAN: >60
GFR NON-AFRICAN AMERICAN: >60
GLUCOSE BLD-MCNC: 121 MG/DL (ref 70–99)
GLUCOSE BLD-MCNC: 126 MG/DL (ref 70–99)
GLUCOSE BLD-MCNC: 127 MG/DL (ref 70–99)
GLUCOSE BLD-MCNC: 130 MG/DL (ref 70–99)
GLUCOSE BLD-MCNC: 140 MG/DL (ref 70–99)
MAGNESIUM: 2 MG/DL (ref 1.8–2.4)
PERFORMED ON: ABNORMAL
PHOSPHORUS: 2.1 MG/DL (ref 2.5–4.9)
POTASSIUM SERPL-SCNC: 4 MMOL/L (ref 3.5–5.1)
SODIUM BLD-SCNC: 133 MMOL/L (ref 136–145)

## 2021-10-10 PROCEDURE — 6370000000 HC RX 637 (ALT 250 FOR IP): Performed by: INTERNAL MEDICINE

## 2021-10-10 PROCEDURE — 6360000002 HC RX W HCPCS: Performed by: INTERNAL MEDICINE

## 2021-10-10 PROCEDURE — 83735 ASSAY OF MAGNESIUM: CPT

## 2021-10-10 PROCEDURE — 6360000002 HC RX W HCPCS: Performed by: FAMILY MEDICINE

## 2021-10-10 PROCEDURE — 1200000000 HC SEMI PRIVATE

## 2021-10-10 PROCEDURE — 36415 COLL VENOUS BLD VENIPUNCTURE: CPT

## 2021-10-10 PROCEDURE — 94761 N-INVAS EAR/PLS OXIMETRY MLT: CPT

## 2021-10-10 PROCEDURE — 80069 RENAL FUNCTION PANEL: CPT

## 2021-10-10 PROCEDURE — 71046 X-RAY EXAM CHEST 2 VIEWS: CPT

## 2021-10-10 PROCEDURE — 2700000000 HC OXYGEN THERAPY PER DAY

## 2021-10-10 PROCEDURE — 2580000003 HC RX 258: Performed by: INTERNAL MEDICINE

## 2021-10-10 RX ORDER — FUROSEMIDE 10 MG/ML
INJECTION INTRAMUSCULAR; INTRAVENOUS
Status: DISPENSED
Start: 2021-10-10 | End: 2021-10-11

## 2021-10-10 RX ORDER — FUROSEMIDE 10 MG/ML
20 INJECTION INTRAMUSCULAR; INTRAVENOUS ONCE
Status: COMPLETED | OUTPATIENT
Start: 2021-10-10 | End: 2021-10-10

## 2021-10-10 RX ORDER — FUROSEMIDE 10 MG/ML
40 INJECTION INTRAMUSCULAR; INTRAVENOUS ONCE
Status: DISCONTINUED | OUTPATIENT
Start: 2021-10-10 | End: 2021-10-11

## 2021-10-10 RX ORDER — FUROSEMIDE 10 MG/ML
40 INJECTION INTRAMUSCULAR; INTRAVENOUS ONCE
Status: COMPLETED | OUTPATIENT
Start: 2021-10-10 | End: 2021-10-10

## 2021-10-10 RX ADMIN — FUROSEMIDE 40 MG: 10 INJECTION, SOLUTION INTRAMUSCULAR; INTRAVENOUS at 18:45

## 2021-10-10 RX ADMIN — ONDANSETRON 4 MG: 2 INJECTION INTRAMUSCULAR; INTRAVENOUS at 17:31

## 2021-10-10 RX ADMIN — FENOFIBRATE 160 MG: 160 TABLET ORAL at 09:49

## 2021-10-10 RX ADMIN — SODIUM CHLORIDE, PRESERVATIVE FREE 10 ML: 5 INJECTION INTRAVENOUS at 10:01

## 2021-10-10 RX ADMIN — ACETAMINOPHEN 650 MG: 325 TABLET ORAL at 21:15

## 2021-10-10 RX ADMIN — FUROSEMIDE 20 MG: 10 INJECTION, SOLUTION INTRAMUSCULAR; INTRAVENOUS at 17:05

## 2021-10-10 RX ADMIN — DOCUSATE SODIUM 100 MG: 100 CAPSULE, LIQUID FILLED ORAL at 09:49

## 2021-10-10 RX ADMIN — DIBASIC SODIUM PHOSPHATE, MONOBASIC POTASSIUM PHOSPHATE AND MONOBASIC SODIUM PHOSPHATE 2 TABLET: 852; 155; 130 TABLET ORAL at 17:06

## 2021-10-10 RX ADMIN — ATORVASTATIN CALCIUM 10 MG: 10 TABLET, FILM COATED ORAL at 09:49

## 2021-10-10 RX ADMIN — ONDANSETRON 4 MG: 2 INJECTION INTRAMUSCULAR; INTRAVENOUS at 10:01

## 2021-10-10 RX ADMIN — DIBASIC SODIUM PHOSPHATE, MONOBASIC POTASSIUM PHOSPHATE AND MONOBASIC SODIUM PHOSPHATE 2 TABLET: 852; 155; 130 TABLET ORAL at 21:01

## 2021-10-10 RX ADMIN — DIBASIC SODIUM PHOSPHATE, MONOBASIC POTASSIUM PHOSPHATE AND MONOBASIC SODIUM PHOSPHATE 2 TABLET: 852; 155; 130 TABLET ORAL at 09:49

## 2021-10-10 RX ADMIN — SODIUM CHLORIDE, PRESERVATIVE FREE 10 ML: 5 INJECTION INTRAVENOUS at 21:01

## 2021-10-10 ASSESSMENT — PAIN SCALES - GENERAL
PAINLEVEL_OUTOF10: 3
PAINLEVEL_OUTOF10: 0
PAINLEVEL_OUTOF10: 0

## 2021-10-10 NOTE — PROGRESS NOTES
Patient resting in bed, Lebron Fall Risk: High (45 and higher), Pain Level: 0, vitals stable, assisted to position of comfort, call light and belongings in reach, encouraged to call with needs, will continue to monitor.   Vitals:    10/09/21 2312   BP: 114/63   Pulse: 104   Resp:    Temp: 97.9 °F (36.6 °C)   SpO2: 90%

## 2021-10-10 NOTE — PROGRESS NOTES
PS Dr. Glenny Corley, [facility]. - [facility] I wanted to make sure you saw CXR and did you still want me to hold Lasix? Thank you. \"    She replied, \"Please give it. \"

## 2021-10-10 NOTE — PROGRESS NOTES
Hospitalist Progress Note    Date of Admission: 10/3/2021    Chief Complaint: Fatigue    Hospital Course:                         78 y.o. male who presented to [facility] with past medical history of depression, hypertension, hyperlipidemia, osteoarthritis, Iva Arsalan, type 2 diabetes, GERD presented to the ED with chief complaint of fatigue. Patient reported fatigue has been going on for over a month progressively worsening does have caregivers at home. Patient reports he lives alone does require assistance. Patient also reports that he has chronic vertigo and dizziness however he does take meclizine. Patient reports that he has been progressively getting worse and not feeling well and has been having constipation as well. With decreased appetite. Patient denied ever having an EGD done nor prior history of cancer. CODE STATUS discussed and the patient proceeded with full code. Subjective: SOB today. Increased O2 up to 6L today. Nurse reports some confusion. Still with difficulty swallowing. Labs:   No results for input(s): WBC, HGB, HCT, PLT in the last 72 hours. Recent Labs     10/08/21  0538 10/09/21  0529 10/10/21  0500    136 133*   K 3.9 3.8 4.0    99 96*   CO2 26 27 30   BUN 33* 34* 40*   CREATININE 0.6* <0.5* 0.6*   CALCIUM 9.5 9.4 9.2   PHOS 1.7* 1.7* 2.1*     No results for input(s): AST, ALT, BILIDIR, BILITOT, ALKPHOS in the last 72 hours. No results for input(s): INR in the last 72 hours. Physical Exam Performed:    /74   Pulse 111   Temp 97.7 °F (36.5 °C) (Oral)   Resp 20   Ht 5' 8\" (1.727 m)   Wt 160 lb 7.9 oz (72.8 kg)   SpO2 100%   BMI 24.40 kg/m²     General appearance: No apparent distress, appears stated age and cooperative. HEENT: Pupils equal, round, and reactive to light. Conjunctivae/corneas clear. Neck: Supple, no jugular venous distention. Trachea midline with full range of motion. Respiratory:  Normal respiratory effort.  Clear to auscultation, bilaterally without Rales/Wheezes/Rhonchi. Cardiovascular: Regular rate and rhythm with normal S1/S2 without murmurs, rubs or gallops. Abdomen: Soft, non-tender, non-distended with normal bowel sounds. Musculoskelatal: No clubbing, cyanosis. LUE swelling with focal erythema over AC. RUE mild swelling. Full range of motion without deformity. Neurologic:  Neurovascularly intact without any focal sensory/motor deficits. Cranial nerves: II-XII intact, grossly non-focal.  Psychiatric: Alert and oriented, thought content appropriate  Skin: Skin color, texture, turgor normal.  No rashes or lesions. Capillary Refill: Brisk,< 3 seconds   Peripheral Pulses: +2 palpable, equal bilaterally       Assessment/Plan:    Active Hospital Problems    Diagnosis     Hyponatremia [E87.1]      Acute hypoxic respiratory failure; etiology unclear, could be edema  CTA negative for PE  Noted pulmonary congestion vs lymphangitic spread  Echocardiogram showed normal LVEF  Ruled out COVID; rapid negative and PCR negative  Worsening. Wean O2 as tolerated. CXR pending. Esophageal Mass, rule out Esophageal CA  Imaging raises suspicion for pulmonary edema vs lymphangitic spread, possible liver and splenic metastasis. He reports a long standing history of GERD symptoms but does not recall ever having an EGD. GI consulted; EGD 10/8, showing esophageal ulceration/mass. Biopsies pending   Oncology following     Acute hyponatremia:  Nephrology following  Improving  Monitor off IVF    Hypercalcemia: Suspected malignancy  PTH low  Zoledronic acid given  IVF and Lasix  Improved    Left common iliac: Severe narrowing evident on CT  Asymptomatic    Diabetes Mellitus, Type 2: Controlled. Hold home regimen. Monitor blood sugar and adjust regimen as needed. Diabetic (Carb-controlled) diet. Hypomagnesemia - replete. Repeat labs in am. Improved.     DVT Prophylaxis: Lovenox  Diet: Adult Oral Nutrition Supplement; Diabetic Oral Supplement  ADULT DIET; Full Liquid  Code Status: Full Code  PT/OT Eval Status: NA    Dispo - Unclear. Speech defers diet to GI. Await Oncology input on any additional testing needed while here. Otherwise, not sure he needs to wait until Monday/Tuesday for biopsy results.      Julieta Glover MD

## 2021-10-10 NOTE — PROGRESS NOTES
or cell phone  Lender , MD          CC/reason for consult :     Hyponatremia/hypercalcemia     HPI :     Ebenezer Caldwell is a 78 y.o. male presented to   the hospital on 10/3/2021 with fatigue. He has been eating and drinking very poorly for last several days. Has fatigue for more than a month. Also has dizziness, constipated. Has significant loss of weight. History of smoking diabetes GERD. Currently requiring oxygen  His lab was done which was positive for hyponatremia and hypercalcemia. He is currently on IV fluids. He had a CT angiogram done for shortness of breath and was found to have negative for PE. Also found to have hypercalcemia. CT scan of abdomen and pelvis done which showed lower esophagus thickening suggestive of possible malignancy. A  We are consulted for hyponatremia and hypercalcemia      ROS:     Seen with- no family    positives in bold   Constitutional:  fever, chills, weakness, weight change, fatigue  Skin:  rash, pruritus, hair loss, bruising, dry skin, petechiae  Head, Face, Neck   headaches, swelling,  cervical adenopathy  Respiratory: shortness of breath, cough, or wheezing  Cardiovascular: chest pain, palpitations, dizzy, edema  Gastrointestinal: nausea, vomiting, diarrhea, constipation,belly pain    Yellow skin, blood in stool  Musculoskeletal:  back pain, muscle weakness, gait problems,       joint pain or swelling. Genitourinary:  dysuria, poor urine flow, flank pain, blood in urine  Neurologic:  vertigo, TIA'S, syncope, seizures, focal weakness  Psychosocial:  insomnia, anxiety, or depression.   Additional positive findings:                  All other remaining systems are negative or unable to obtain        PMH/PSH/SH/Family History:     Past Medical History:   Diagnosis Date    [facility]ere[facility] occlusion [facility] [address]     Depression     Diabetes mellitus ([facility] [address]     Diverticulitis     Fatty liver     GERD (gastroesophageal reflux disease)     Hyperlipidemia     Hypertension     Osteoarthritis        Past Surgical History:   Procedure Laterality Date    HERNIA REPAIR      LAPAROTOMY  06/14/2018    with dr Babin Russian 6/14/18        reports that he has been smoking cigarettes. He has a 59.00 pack-year smoking history. He has never used smokeless tobacco. He reports previous alcohol use. He reports that he does not use drugs. family history includes Cancer in his father and mother; Heart Disease in his father.          Medication:     Current Facility-Administered Medications: morphine (PF) injection 2 mg, 2 mg, IntraVENous, Q4H PRN  HYDROcodone-acetaminophen (NORCO) 5-325 MG per tablet 1 tablet, 1 tablet, Oral, Q4H PRN  phosphorus (K PHOS NEUTRAL) tablet 2 tablet, 500 mg, Oral, TID  atorvastatin (LIPITOR) tablet 10 mg, 10 mg, Oral, Daily  pantoprazole (PROTONIX) tablet 40 mg, 40 mg, Oral, QAM AC  sodium chloride flush 0.9 % injection 10 mL, 10 mL, IntraVENous, 2 times per day  sodium chloride flush 0.9 % injection 10 mL, 10 mL, IntraVENous, PRN  0.9 % sodium chloride infusion, 25 mL, IntraVENous, PRN  potassium chloride 10 mEq/100 mL IVPB (Peripheral Line), 10 mEq, IntraVENous, PRN  magnesium sulfate 2000 mg in 50 mL IVPB premix, 2,000 mg, IntraVENous, PRN  promethazine (PHENERGAN) tablet 12.5 mg, 12.5 mg, Oral, Q6H PRN **OR** ondansetron (ZOFRAN) injection 4 mg, 4 mg, IntraVENous, Q6H PRN  acetaminophen (TYLENOL) tablet 650 mg, 650 mg, Oral, Q6H PRN **OR** acetaminophen (TYLENOL) suppository 650 mg, 650 mg, Rectal, Q6H PRN  docusate sodium (COLACE) capsule 100 mg, 100 mg, Oral, Daily       Vitals :     Vitals:    10/10/21 1452   BP: 118/77   Pulse: 107   Resp: 18   Temp: 98 °F (36.7 °C)   SpO2: 91%       I & O :       Intake/Output Summary (Last 24 hours) at 10/10/2021 1720  Last data filed at 10/10/2021 1452  Gross per 24 hour   Intake 652 ml   Output 670 ml   Net -18 ml        Physical Examination :     General appearance: Anxious- no, distressed- no, in good spirits- no ,lethargy   HEENT: Lips- normal, teeth- ok , oral mucosa- moist  Neck : Mass- no, appears symmetrical, JVD- not visible  Respiratory: Respiratory effort-  tachypnic , wheeze- no, crackles - yes  Cardiovascular: Ausculation- No M/R/G, Edema no  Abdomen: visible mass- no, distention- no, scar- no, tenderness- no                            hepatosplenomegaly-  no  Musculoskeletal:  clubbing no,cyanosis- no , digital ischemia- no                           muscle strength- grossly normal , tone - grossly normal  Skin: rashes- no , ulcers- no, induration- no, tightening - no  Psychiatric:  Judgement and insight- normal           AAO X 3  Additional finding: -      LABS:     No results for input(s): WBC, HGB, HCT, PLT in the last 72 hours.   Recent Labs     10/08/21  0538 10/09/21  0529 10/10/21  0500    136 133*   K 3.9 3.8 4.0    99 96*   CO2 26 27 30   BUN 33* 34* 40*   CREATININE 0.6* <0.5* 0.6*   GLUCOSE 102* 123* 130*   MG 1.90 1.70* 2.00   PHOS 1.7* 1.7* 2.1*

## 2021-10-10 NOTE — PROGRESS NOTES
Pt a/o. VSS. Shift assessment complete and charted. Pt c/o constipation and not being able to breathe. Had to turn oxygen from 4.5L NC to 6L NC. Pt states he does not feel as SOB at this time but it comes and goes. Pt c/o nausea- admin prn zofran. BS active. Lungs with fine crackles in bases. Pt stable and denied needs when writer left room.

## 2021-10-11 LAB
ALBUMIN SERPL-MCNC: 2.9 G/DL (ref 3.4–5)
ANION GAP SERPL CALCULATED.3IONS-SCNC: 12 MMOL/L (ref 3–16)
BUN BLDV-MCNC: 50 MG/DL (ref 7–20)
CALCIUM SERPL-MCNC: 9.2 MG/DL (ref 8.3–10.6)
CHLORIDE BLD-SCNC: 94 MMOL/L (ref 99–110)
CO2: 29 MMOL/L (ref 21–32)
CREAT SERPL-MCNC: 0.7 MG/DL (ref 0.8–1.3)
D DIMER: 1009 NG/ML DDU (ref 0–229)
GFR AFRICAN AMERICAN: >60
GFR NON-AFRICAN AMERICAN: >60
GLUCOSE BLD-MCNC: 125 MG/DL (ref 70–99)
GLUCOSE BLD-MCNC: 130 MG/DL (ref 70–99)
GLUCOSE BLD-MCNC: 132 MG/DL (ref 70–99)
GLUCOSE BLD-MCNC: 134 MG/DL (ref 70–99)
GLUCOSE BLD-MCNC: 140 MG/DL (ref 70–99)
MAGNESIUM: 1.9 MG/DL (ref 1.8–2.4)
OCCULT BLOOD SCREENING: NORMAL
PERFORMED ON: ABNORMAL
PHOSPHORUS: 4.3 MG/DL (ref 2.5–4.9)
POTASSIUM SERPL-SCNC: 3.5 MMOL/L (ref 3.5–5.1)
PRO-BNP: 852 PG/ML (ref 0–449)
PROCALCITONIN: 0.26 NG/ML (ref 0–0.15)
SODIUM BLD-SCNC: 135 MMOL/L (ref 136–145)

## 2021-10-11 PROCEDURE — 85379 FIBRIN DEGRADATION QUANT: CPT

## 2021-10-11 PROCEDURE — 6360000002 HC RX W HCPCS: Performed by: FAMILY MEDICINE

## 2021-10-11 PROCEDURE — 97530 THERAPEUTIC ACTIVITIES: CPT

## 2021-10-11 PROCEDURE — 2580000003 HC RX 258: Performed by: INTERNAL MEDICINE

## 2021-10-11 PROCEDURE — 6370000000 HC RX 637 (ALT 250 FOR IP): Performed by: INTERNAL MEDICINE

## 2021-10-11 PROCEDURE — 92526 ORAL FUNCTION THERAPY: CPT

## 2021-10-11 PROCEDURE — 84145 PROCALCITONIN (PCT): CPT

## 2021-10-11 PROCEDURE — 1200000000 HC SEMI PRIVATE

## 2021-10-11 PROCEDURE — 36415 COLL VENOUS BLD VENIPUNCTURE: CPT

## 2021-10-11 PROCEDURE — 83735 ASSAY OF MAGNESIUM: CPT

## 2021-10-11 PROCEDURE — 6360000002 HC RX W HCPCS: Performed by: INTERNAL MEDICINE

## 2021-10-11 PROCEDURE — 2700000000 HC OXYGEN THERAPY PER DAY

## 2021-10-11 PROCEDURE — 83880 ASSAY OF NATRIURETIC PEPTIDE: CPT

## 2021-10-11 PROCEDURE — 82270 OCCULT BLOOD FECES: CPT

## 2021-10-11 PROCEDURE — 80069 RENAL FUNCTION PANEL: CPT

## 2021-10-11 PROCEDURE — 94761 N-INVAS EAR/PLS OXIMETRY MLT: CPT

## 2021-10-11 RX ORDER — FUROSEMIDE 10 MG/ML
20 INJECTION INTRAMUSCULAR; INTRAVENOUS ONCE
Status: COMPLETED | OUTPATIENT
Start: 2021-10-11 | End: 2021-10-11

## 2021-10-11 RX ADMIN — DOCUSATE SODIUM 100 MG: 100 CAPSULE, LIQUID FILLED ORAL at 09:05

## 2021-10-11 RX ADMIN — ENOXAPARIN SODIUM 40 MG: 40 INJECTION SUBCUTANEOUS at 17:24

## 2021-10-11 RX ADMIN — DIBASIC SODIUM PHOSPHATE, MONOBASIC POTASSIUM PHOSPHATE AND MONOBASIC SODIUM PHOSPHATE 1 TABLET: 852; 155; 130 TABLET ORAL at 21:04

## 2021-10-11 RX ADMIN — PANTOPRAZOLE SODIUM 40 MG: 40 TABLET, DELAYED RELEASE ORAL at 05:17

## 2021-10-11 RX ADMIN — DIBASIC SODIUM PHOSPHATE, MONOBASIC POTASSIUM PHOSPHATE AND MONOBASIC SODIUM PHOSPHATE 2 TABLET: 852; 155; 130 TABLET ORAL at 09:05

## 2021-10-11 RX ADMIN — POTASSIUM BICARBONATE 40 MEQ: 782 TABLET, EFFERVESCENT ORAL at 21:03

## 2021-10-11 RX ADMIN — DIBASIC SODIUM PHOSPHATE, MONOBASIC POTASSIUM PHOSPHATE AND MONOBASIC SODIUM PHOSPHATE 2 TABLET: 852; 155; 130 TABLET ORAL at 16:03

## 2021-10-11 RX ADMIN — SODIUM CHLORIDE, PRESERVATIVE FREE 10 ML: 5 INJECTION INTRAVENOUS at 21:04

## 2021-10-11 RX ADMIN — ATORVASTATIN CALCIUM 10 MG: 10 TABLET, FILM COATED ORAL at 09:05

## 2021-10-11 RX ADMIN — SODIUM CHLORIDE, PRESERVATIVE FREE 10 ML: 5 INJECTION INTRAVENOUS at 09:05

## 2021-10-11 RX ADMIN — HYDROCODONE BITARTRATE AND ACETAMINOPHEN 1 TABLET: 5; 325 TABLET ORAL at 05:17

## 2021-10-11 RX ADMIN — HYDROCODONE BITARTRATE AND ACETAMINOPHEN 1 TABLET: 5; 325 TABLET ORAL at 21:04

## 2021-10-11 RX ADMIN — FUROSEMIDE 20 MG: 10 INJECTION, SOLUTION INTRAVENOUS at 19:01

## 2021-10-11 ASSESSMENT — PAIN SCALES - GENERAL
PAINLEVEL_OUTOF10: 9

## 2021-10-11 ASSESSMENT — PAIN DESCRIPTION - PAIN TYPE
TYPE: CHRONIC PAIN
TYPE: CHRONIC PAIN

## 2021-10-11 ASSESSMENT — PAIN - FUNCTIONAL ASSESSMENT: PAIN_FUNCTIONAL_ASSESSMENT: PREVENTS OR INTERFERES WITH MANY ACTIVE NOT PASSIVE ACTIVITIES

## 2021-10-11 ASSESSMENT — PAIN SCALES - WONG BAKER: WONGBAKER_NUMERICALRESPONSE: 8

## 2021-10-11 ASSESSMENT — PAIN DESCRIPTION - LOCATION
LOCATION: GENERALIZED
LOCATION: GENERALIZED

## 2021-10-11 NOTE — PROGRESS NOTES
Occupational Therapy  Facility/Department: [facility] C3 TELE/MED SURG/ONC  Daily Treatment Note  NAME: Galindo Tellez  : 1945  MRN: [id number]    Date of Service: 10/11/2021    Discharge Recommendations:  Subacute/Skilled Nursing Facility   Barriers to home discharge:   [x] Steps to access home entry or bed/bath:   [x] Reported available assist at home upon discharge limited           Assessment   Performance deficits / Impairments: Decreased functional mobility ; Decreased ADL status; Decreased cognition;Decreased strength;Decreased balance;Decreased endurance  Assessment: PTA pt reports independent with BADL's & light meal preparation & functional mobility without AD;now requiring max assist with bed mobility, mod assist of 2 with ISIDRO BARKER for bathroom mobility, depenedent with LE self care; pt to benefit from skilled OT Services  OT Education: OT Role;Plan of Care;Transfer Training;ADL Adaptive Strategies;Orientation  Patient Education: disease specific:  importance of use of RED/nurse call light for assist with transfers/ADL needs  Barriers to Learning: cogntion  REQUIRES OT FOLLOW UP: Yes  Activity Tolerance  Activity Tolerance: Treatment limited secondary to decreased cognition  Activity Tolerance: semi-hernandez's:  BP = 101/59, HR = 106, 90 % O 2 sats on 5 L O 2: sitting EOB:  BP  = 109/61; semi-hernandez's in bed after bathroom/pericare:  BP = 129/86  Safety Devices  Safety Devices in place: Yes  Type of devices: Call light within reach; Left in bed;Bed alarm in place;Nurse notified         Patient Diagnosis(es): The primary encounter diagnosis was Hypercalcemia. Diagnoses of Hyponatremia, Hypoxia, Hypoglycemia, Elevated troponin, Lactic acidosis, Abnormal CT scan, esophagus, Abnormal CT scan of lung, Lesion of spleen, Liver lesion, and Abdominal lymphadenopathy were also pertinent to this visit.       has a past medical history of Cerebral artery occlusion with cerebral infarction [facility]), Depression, Diabetes mellitus ([facility] [address] Diverticulitis, Fatty liver, GERD (gastroesophageal reflux disease), Hyperlipidemia, Hypertension, and Osteoarthritis. has a past surgical history that includes hernia repair; laparotomy (06/14/2018); and Upper gastrointestinal endoscopy (N/A, 10/8/2021). Restrictions  Restrictions/Precautions  Restrictions/Precautions: General Precautions, Fall Risk  Position Activity Restriction  Other position/activity restrictions: High fall risk per nursing assessment, up with assistance, 5 L O2, issa  Subjective   General  Chart Reviewed: Yes  Patient assessed for rehabilitation services?: Yes  Family / Caregiver Present: No  Referring Practitioner: Jitendra Storm  Diagnosis: hypercalcemia, esophageal mass, liver met  Subjective  Subjective: Pt agreeable to OT  General Comment  Comments: RN cleared pt for OT; pt awake in bed  Pain Assessment  Pain Assessment: Faces  Pain Type: Chronic pain  Pain Location: Generalized  Functional Pain Assessment: Prevents or interferes with many active not passive activities  Non-Pharmaceutical Pain Intervention(s): Ambulation/Increased Activity; Therapeutic presence;Repositioned  Pre Treatment Pain Screening  Intervention List: Patient able to continue with treatment  Vital Signs  Patient Currently in Pain: Yes   Orientation  Orientation  Overall Orientation Status: Impaired  Orientation Level: Oriented to person;Disoriented to time;Disoriented to situation;Disoriented to place  Objective    ADL  LE Dressing: Dependent/Total (to manage non-skid socks)  Toileting: Dependent/Total (issa catheter & incontinent of loose stool)        Balance  Sitting Balance: Stand by assistance (sitting on toilet with ISIDRO STEDY)  Standing Balance: Dependent/Total (with ISIDRO STEDY)  Standing Balance  Activity: bed<-->toilet, sit<-->stand for pericare  Toilet Transfers  Toilet - Technique:  (dependent with ISIDRO STEDY)  Equipment Used: Standard toilet  Toilet Transfer: 2 Person assistance (mod assist of 2)  Bed mobility  Rolling to Left: Minimal assistance  Rolling to Right: Minimal assistance  Supine to Sit: Maximum assistance (log rolling to Left)  Sit to Supine: Minimal assistance  Transfers  Sit to stand: 2 Person assistance (min/mod assist of 2 with ISIDRO BARKER)  Stand to sit: 2 Person assistance (mod assist of 2 from ISIDRO BARKER)        Cognition  Overall Cognitive Status: Exceptions (emotionally labile)  Arousal/Alertness: Delayed responses to stimuli  Following Commands: Follows one step commands with repetition  Attention Span: Difficulty attending to directions  Memory: Decreased short term memory;Decreased recall of recent events;Decreased recall of precautions  Safety Judgement: Decreased awareness of need for safety  Insights: Not aware of deficits  Initiation: Requires cues for some  Sequencing: Requires cues for some           Plan   Plan  Times per week: 3-5x/ week  Current Treatment Recommendations: Self-Care / ADL, Functional Mobility Training, Balance Training, Endurance Training, Safety Education & Training    AM-PAC Score        AM-[facility] Inpatient Daily Activity Raw Score: 13 (10/11/21 1127)  AM-PAC Inpatient ADL T-Scale Score : 32.03 (10/11/21 1127)  ADL Inpatient CMS 0-100% Score: 63.03 (10/11/21 1127)  ADL Inpatient CMS G-Code Modifier : CL (10/11/21 1127)    Goals  Short term goals  Time Frame for Short term goals: 1 week (10/14) unless noted  Short term goal 1: Perform functional transfers with SBA and RW; 10/11 mod assist of 2 with ISIDRO BARKER  Short term goal 2: Perform UE exer 15x each to improve endurance by 10/11  Short term goal 3: Perform 2-3 UE ADL tasks while seated and SBA  Patient Goals   Patient goals :  \"Go home\"       Therapy Time   Individual Concurrent Group Co-treatment   Time In [facility] 30         Time Out 1023         Minutes [address] [place] [address]

## 2021-10-11 NOTE — PROGRESS NOTES
Comprehensive Nutrition Assessment    Type and Reason for Visit:  Initial    Nutrition Recommendations/Plan:   1. Continue full liquid diet  2. Diet texture/liquid level per SLP recommendations  3. Due to inadequate intake (0-25% x 7 days), discussion of palliative care vs nutrition support should be considered. Consult dietitian if tube feeding is desired and consistent with patient's plan of care. 4. Encourage po intakes    Nutrition Assessment:  LOS assessment. Pt admitted with c/o fatigue. S/p EGD 10/8, showing esophageal ulceration/mass. Biopsies pending. Currently on a full liquid diet, pt reports that he has not been consuming very much and mostly drinks water and eats ice chips. Pt reports that he has difficulty tolerating his diet at times. Pt states that he has had a poor appetite \"for weeks\". Pt endorses having a weight loss of 30 lb since January. JOSEF d/t lack of weight Hx in EMR. Pt reports that he has not been consuming ONS, will d/c per pt request. Will monitor. Malnutrition Assessment:  Malnutrition Status: At risk for malnutrition (Comment)      Estimated Daily Nutrient Needs:  Energy (kcal):  9124-7508; Weight Used for Energy Requirements:  Current     Protein (g):  77-93; Weight Used for Protein Requirements:  Current        Fluid (ml/day):  1 ml/kcal      Nutrition Related Findings:  Hypoactive BS. Na 135 mmol/L. Wounds:  None       Current Nutrition Therapies:    Adult Oral Nutrition Supplement; Diabetic Oral Supplement  ADULT DIET; Full Liquid    Anthropometric Measures:  · Height: 5' 8\" (172.7 cm)  · Current Body Weight: 170 lb 10.2 oz (77.4 kg)   · Ideal Body Weight: 154 lbs  · BMI: 26  · BMI Categories: Overweight (BMI 25.0-29. 9)       Nutrition Diagnosis:   · Inadequate oral intake related to swallowing difficulty as evidenced by poor intake prior to admission, intake 0-25%    Nutrition Interventions:   Food and/or Nutrient Delivery:  Continue Current Diet (Consider alternative nutrition)  Nutrition Education/Counseling:  No recommendation at this time   Coordination of Nutrition Care:  Continue to monitor while inpatient    Goals:  Pt will meet 50% of nutrition needs or greater this admission       Nutrition Monitoring and Evaluation:   Behavioral-Environmental Outcomes:  None Identified   Food/Nutrient Intake Outcomes:  Food and Nutrient Intake  Physical Signs/Symptoms Outcomes:  GI Status, Chewing or Swallowing, Weight     Discharge Planning:     Too soon to determine     Electronically signed by Varun George RD, LD on 10/11/21 at 2:02 PM EDT    Contact: [phone]

## 2021-10-11 NOTE — CARE COORDINATION
Hospital day 7: Patient on C3 re Hyponatremia followed by IM, Nephrology, GI, and Oncology with outpatient pending biopsy. Patient with SNF recs pending at [facility], writer placed follow up call this am to check on status, VM left for return call. Patient currently on full liquid diet, will need advancement prior to DC to SNF/ biospy pending. Barnetta Gottron, LSW  1735: [facility] to [facility] or deny at this time re pending biopsy cancer treatments may be barrier, writer to notify Patient for back up choice. Barnetta Gottron, LSW

## 2021-10-11 NOTE — PROGRESS NOTES
Progress Note  Date:10/11/2021       [facility]:[id number]  Patient Aidan Downs     YOB: 1945     Age:76 y.o. Subjective    Subjective:  Symptoms:  Stable. Pain:  He reports no pain. Review of Systems  Objective         Vitals Last 24 Hours:  TEMPERATURE:  Temp  Av.6 °F (36.4 °C)  Min: 97.5 °F (36.4 °C)  Max: 97.9 °F (36.6 °C)  RESPIRATIONS RANGE: Resp  Av.5  Min: 17  Max: 20  PULSE OXIMETRY RANGE: SpO2  Av.3 %  Min: 91 %  Max: 94 %  PULSE RANGE: Pulse  Av.8  Min: 103  Max: 116  BLOOD PRESSURE RANGE: Systolic (81CCB), DSY:626 , Min:103 , RTF:616   ; Diastolic (87UCU), RWZ:39, Min:61, Max:86    I/O (24Hr): Intake/Output Summary (Last 24 hours) at 10/11/2021 1721  Last data filed at 10/11/2021 1441  Gross per 24 hour   Intake 245 ml   Output 900 ml   Net -655 ml     Objective:  General Appearance:  Not in pain and in no acute distress. Vital signs: (most recent): Blood pressure 103/61, pulse 115, temperature 97.7 °F (36.5 °C), temperature source Oral, resp. rate 17, height 5' 8\" (1.727 m), weight 170 lb 10.2 oz (77.4 kg), SpO2 92 %. Abdomen: Abdomen is soft. Bowel sounds are normal.   There is no abdominal tenderness. Labs/Imaging/Diagnostics    Labs:  CBC:No results for input(s): WBC, RBC, HGB, HCT, MCV, RDW, PLT in the last 72 hours. CHEMISTRIES:  Recent Labs     10/09/21  0529 10/10/21  0500 10/11/21  0459    133* 135*   K 3.8 4.0 3.5   CL 99 96* 94*   CO2 27 30 29   BUN 34* 40* 50*   CREATININE <0.5* 0.6* 0.7*   GLUCOSE 123* 130* 132*   PHOS 1.7* 2.1* 4.3   MG 1.70* 2.00 1.90     PT/INR:No results for input(s): PROTIME, INR in the last 72 hours. APTT:No results for input(s): APTT in the last 72 hours. LIVER PROFILE:No results for input(s): AST, ALT, BILIDIR, BILITOT, ALKPHOS in the last 72 hours.     Imaging Last 24 Hours:  XR CHEST (2 VW)    Result Date: 10/10/2021  EXAMINATION: TWO XRAY VIEWS OF THE CHEST 10/10/2021 12:34 pm

## 2021-10-11 NOTE — PROGRESS NOTES
Speech Language Pathology  Facility/Department: [facility] C3 TELE/MED SURG/ONC  Dysphagia Daily Treatment Note    NAME: Mariette Oppenheim  : 1945  MRN: [id number]    Patient Diagnosis(es):   Patient Active Problem List    Diagnosis Date Noted    Hyponatremia 10/04/2021    Iron deficiency anemia secondary to inadequate dietary iron intake 2020    Colonoscopy refused 2020    Type 2 diabetes mellitus without complication, without long-term current use of insulin ([facility] [address] 2019    Mixed hyperlipidemia 2019    Essential hypertension 2019    Gastroesophageal reflux disease without esophagitis 2019    Cerebrovascular accident (CVA) ([facility] [address] 2019    Brain TIA 2015    Degeneration of cervical intervertebral disc 2014    Degeneration of lumbar or lumbosacral intervertebral disc 2014    Lumbar radiculopathy 2014    Cervical radiculopathy, chronic 2014     Allergies: Allergies   Allergen Reactions    Rosuvastatin      Other reaction(s): Muscle Aches     Onset Date: 10/03/21  Current Diet Level:  Full liquid diet per MD    Pain:  Pain Assessment  Pain Assessment: 0-10  Pain Level: 9  Lizama-Baker Pain Rating: No hurt  Patient's Stated Pain Goal: No pain    Diet Tolerance: The therapist spoke to the pt's nurse who approved for the therapist to see the pt this AM. She said that the pt's intake has been poor    P.O. Trials: Thin   X X 3 sips of thin water via spoon  X 4 sips of water via cup  X 4 sips of thin soup   Nectar       Honey       Puree       Solid         Impressions: The pt appeared to be significantly confused. He needed ongoing cues to attend to the therapist and to accept PO trials. He was elevated in bed to a near 90 degree position for the PO trials. Noted intermittent wet coughing prior to PO trials today. The pt was given a total of 11 sips of thin liquid including thin water and soup. Swallow onset was only mildly delayed.

## 2021-10-11 NOTE — PROGRESS NOTES
Physical Therapy  Facility/Department: [facility] C3 TELE/MED SURG/ONC  Daily Treatment Note  NAME: Julia Musa  : 1945  MRN: [id number]    Date of Service: 10/11/2021    Discharge Recommendations:  Subacute/Skilled Nursing Facility   PT Equipment Recommendations  Equipment Needed: No  Other: Defer to facility    Assessment   Body structures, Functions, Activity limitations: Decreased functional mobility ; Decreased strength;Decreased cognition;Decreased endurance;Decreased balance;Decreased posture;Decreased safe awareness; Increased pain  Assessment: Pt session limited by confusion and inability to follow commands consistently. Pt  transferred mod A of 2 to [facility] and used commode for BM. Pt unable to perform hygiene and is in obvious pain and distress, RN notified. Pt would benefit from con't skilled PT and SNF at D/C. Treatment Diagnosis: Generalized weakness, decreased (I) with mobility  Specific instructions for Next Treatment: Progress ther ex and mobility as tolerated  Prognosis: Good;Fair  Decision Making: Medium Complexity  PT Education: Goals; General Safety;PT Role;Disease Specific Education;Plan of Care; Functional Mobility Training;Home Exercise Program;Precautions;Transfer Training  Patient Education: Pt educated on importance of mobility, safety with transfers, safety with use of RW, and importance of assessing BP with change in position. Pt reports understanding but will require reinforcement  Barriers to Learning: Impaired cognition/delayed processing  REQUIRES PT FOLLOW UP: Yes  Activity Tolerance  Activity Tolerance: semi-hernandez's:  BP = 101/59, HR = 106, 90 % O 2 sats on 5 L O 2: sitting EOB:  BP  = 109/61; semi-hernandez's in bed after bathroom/pericare:  BP = 129/86     Patient Diagnosis(es): The primary encounter diagnosis was Hypercalcemia.  Diagnoses of Hyponatremia, Hypoxia, Hypoglycemia, Elevated troponin, Lactic acidosis, Abnormal CT scan, esophagus, Abnormal CT scan of lung, Lesion of spleen, Liver lesion, and Abdominal lymphadenopathy were also pertinent to this visit. has a past medical history of Cerebral artery occlusion with cerebral infarction ([facility] [address] Depression, Diabetes mellitus ([address] Diverticulitis, Fatty liver, GERD (gastroesophageal reflux disease), Hyperlipidemia, Hypertension, and Osteoarthritis. has a past surgical history that includes hernia repair; laparotomy (06/14/2018); and Upper gastrointestinal endoscopy (N/A, 10/8/2021). Restrictions  Restrictions/Precautions  Restrictions/Precautions: General Precautions, Fall Risk  Position Activity Restriction  Other position/activity restrictions: High fall risk per nursing assessment, up with assistance, 5 L O2, issa     Subjective   General  Chart Reviewed: Yes  Response To Previous Treatment: Patient with no complaints from previous session. Family / Caregiver Present: No  Referring Practitioner: Dr. Kedar Hamilton  Comments: Pt resting in bed upon entry of therapy staff, RN cleared for therapy. Pain Screening  Patient Currently in Pain: Yes  Pain Assessment  Pain Assessment: Faces  Lizama-Baker Pain Rating: Hurts whole lot  Pain Type: Chronic pain  Pain Location: Generalized  Non-Pharmaceutical Pain Intervention(s): Emotional support  Vital Signs  Patient Currently in Pain: Yes          Objective   Bed mobility  Rolling to Left: Moderate assistance  Supine to Sit: Maximum assistance (to L with HOB elevated, confusion with commands given for this actiivty)  Transfers  Sit to Stand: Dependent/Total;2 Person Assistance; Moderate Assistance (from low commode to stedy, less needed for EOB > stedy transfer)  Stand to sit: Minimal Assistance  Bed to Chair: Dependent/Total (using stedy bed > commode > bed)  Ambulation  Ambulation?: No        Exercises  Comments: Pt confused, sat on commode several minutes for BM. Even after commands instructions given for tasks pt says \"you need to tell me what you want me to do\".   Pt uncomfortable calling out \"God help me, I'm trying\" and other statements similar to this throughout the session. Reports dizziness, but BP's are stable. AM-PAC Score  AM-PAC Inpatient Mobility Raw Score : 11 (10/11/21 1539)  AM-PAC Inpatient T-Scale Score : 33.86 (10/11/21 1539)  Mobility Inpatient CMS 0-100% Score: 72.57 (10/11/21 1539)  Mobility Inpatient CMS G-Code Modifier : CL (10/11/21 1539)          Goals  Short term goals  Time Frame for Short term goals: 1 week, 10/14/21 (unless otherwise specified)  Short term goal 1: Pt will transfer supine <-> sit with supervision, 10/11 max A  to L  Short term goal 2: Pt will transfer sit <-> stand and bed>chair using least AD with supervision, 10/11 mod A of 2 to stedy  Short term goal 3: Pt will ambulate x 50 feet using least AD with CGA x 1 10/11 NT  Short term goal 4: By 10/10/21: Pt will tolerate 12-15 reps BLE exercise for strengthening, balance, and endurance 10/11 NT  Patient Goals   Patient goals : \"To get stronger\"    Plan    Plan  Times per week: 3-5x/week in acute care  Times per day: Daily  Specific instructions for Next Treatment: Progress ther ex and mobility as tolerated  Current Treatment Recommendations: Strengthening, Balance Training, Endurance Training, Functional Mobility Training, Transfer Training, Gait Training, Safety Education & Training, Patient/Caregiver Education & Training, Equipment Evaluation, Education, & procurement, Neuromuscular Re-education, Home Exercise Program, Pain Management, Stair training  Safety Devices  Type of devices:  All fall risk precautions in place, Call light within reach, Bed alarm in place, Gait belt, Left in bed, Nurse notified     Therapy Time   Individual Concurrent Group Co-treatment   Time In 0950         Time Out 1023         Minutes [address] [place] [address] [id number]

## 2021-10-11 NOTE — PROGRESS NOTES
Pt a/o. VSS. Shift assessment complete and charted. Weaned oxygen to 4L NC and pt stable at 90-92%. Pt c/o abd cramping but when you ask if he wants pain medication he say no. BS active. Lungs with fine crackles in L base and mostly diminished everywhere else. Pt stable and denied needs when writer left room.

## 2021-10-11 NOTE — PROGRESS NOTES
[facility]    [facility]              [phone]                         Interval History and plan:      Required 6 L of oxygen yesterday-down to 4 L now  Blood pressure shocked at systolic 097  Potassium borderline at 3.5  CO2 on high side at 29  Creatinine good  MG 1.9  Phosphorus better at 4.3  Calcium is 9.2  -was 5.3 on admission  Chest from yesterday showed pulmonary edema  Plan: We will give a low-dose of Lasix 20 mg IV today  Supplement potassium  Last echo shows moderate AAS not sure this is the cause of pulmonary edema                     Assessment :       SOB   CXR  Small bilateral pleural effusions and stable interstitial-alveolar opacities,   consistent with pulmonary edema.  Alveolar proteinosis may also have this   appearance. Hyponatremia  Sodium 124 on admission  Urine sodium 32   possible cancer with metastasis    Hypercalcemia  Likely met malignancy with metastasis  Calcium 12.5 on admission  PTH 5.2  Got Zoledronic acid     Esophageal Mass  Imaging raises suspicion for pulmonary edema vs lymphangitic spread, possible liver and splenic metastasis. EGD 10/8, showing esophageal ulceration/mass.    Biopsies pending   Oncology following       Diabetes MellitusType 2   Controlled.     Hypomagnesemia    Improved.       Hypertension   BP: (103-114)/(61-72)  Pulse:  [103-115]   BP goal inpatient [phone] systolic inpatient         Loss of weight/poor p.o. intake-CT abdomen suggestive of possible lower esophagus malignancy, CT scan of the lungs showed septal thickening lymphangitic carcinomatosis versus pulmonary edema  Also nonspecific small hypoattenuating lesions within the liver and mild upper abdominal lymphadenopathy  Possible splenic metastasis      [facility] would like to thank Sanjeev Rodriguez MD   for opportunity to serve this patient      Please call with questions at-   24 Hrs Answering service [phone] or  7 am- 5 pm via Perfect serve or cell phone  Augustus Romano MD          CC/reason for consult :     Hyponatremia/hypercalcemia     HPI :     Blaine Daley is a 78 y.o. male presented to   the hospital on 10/3/2021 with fatigue. He has been eating and drinking very poorly for last several days. Has fatigue for more than a month. Also has dizziness, constipated. Has significant loss of weight. History of smoking diabetes GERD. Currently requiring oxygen  His lab was done which was positive for hyponatremia and hypercalcemia. He is currently on IV fluids. He had a CT angiogram done for shortness of breath and was found to have negative for PE. Also found to have hypercalcemia. CT scan of abdomen and pelvis done which showed lower esophagus thickening suggestive of possible malignancy. A  We are consulted for hyponatremia and hypercalcemia      ROS:     Seen with- no family    positives in bold   Constitutional:  fever, chills, weakness, weight change, fatigue  Skin:  rash, pruritus, hair loss, bruising, dry skin, petechiae  Head, Face, Neck   headaches, swelling,  cervical adenopathy  Respiratory: shortness of breath, cough, or wheezing  Cardiovascular: chest pain, palpitations, dizzy, edema  Gastrointestinal: nausea, vomiting, diarrhea, constipation,belly pain    Yellow skin, blood in stool  Musculoskeletal:  back pain, muscle weakness, gait problems,       joint pain or swelling. Genitourinary:  dysuria, poor urine flow, flank pain, blood in urine  Neurologic:  vertigo, TIA'S, syncope, seizures, focal weakness  Psychosocial:  insomnia, anxiety, or depression.   Additional positive findings:                  All other remaining systems are negative or unable to obtain        PMH/PSH/SH/Family History:     Past Medical History:   Diagnosis Date    [facility] ([facility] [address]     Depression     Diabetes mellitus ([facility] [address]     Diverticulitis     Fatty liver     GERD (gastroesophageal reflux disease)  Hyperlipidemia     Hypertension     Osteoarthritis        Past Surgical History:   Procedure Laterality Date    HERNIA REPAIR      LAPAROTOMY  06/14/2018    with dr Marylen Boast 6/14/18    UPPER GASTROINTESTINAL ENDOSCOPY N/A 10/8/2021    EGD BIOPSY performed by Trinh Osborne MD at [address] [place] [address]        reports that he has been smoking cigarettes. He has a 59.00 pack-year smoking history. He has never used smokeless tobacco. He reports previous alcohol use. He reports that he does not use drugs. family history includes Cancer in his father and mother; Heart Disease in his father.          Medication:     Current Facility-Administered Medications: enoxaparin (LOVENOX) injection 40 mg, 40 mg, SubCUTAneous, Daily  furosemide (LASIX) injection 40 mg, 40 mg, IntraVENous, Once  morphine (PF) injection 2 mg, 2 mg, IntraVENous, Q4H PRN  HYDROcodone-acetaminophen (NORCO) 5-325 MG per tablet 1 tablet, 1 tablet, Oral, Q4H PRN  phosphorus (K PHOS NEUTRAL) tablet 2 tablet, 500 mg, Oral, TID  atorvastatin (LIPITOR) tablet 10 mg, 10 mg, Oral, Daily  pantoprazole (PROTONIX) tablet 40 mg, 40 mg, Oral, QAM AC  sodium chloride flush 0.9 % injection 10 mL, 10 mL, IntraVENous, 2 times per day  sodium chloride flush 0.9 % injection 10 mL, 10 mL, IntraVENous, PRN  0.9 % sodium chloride infusion, 25 mL, IntraVENous, PRN  potassium chloride 10 mEq/100 mL IVPB (Peripheral Line), 10 mEq, IntraVENous, PRN  magnesium sulfate 2000 mg in 50 mL IVPB premix, 2,000 mg, IntraVENous, PRN  promethazine (PHENERGAN) tablet 12.5 mg, 12.5 mg, Oral, Q6H PRN **OR** ondansetron (ZOFRAN) injection 4 mg, 4 mg, IntraVENous, Q6H PRN  acetaminophen (TYLENOL) tablet 650 mg, 650 mg, Oral, Q6H PRN **OR** acetaminophen (TYLENOL) suppository 650 mg, 650 mg, Rectal, Q6H PRN  docusate sodium (COLACE) capsule 100 mg, 100 mg, Oral, Daily       Vitals :     Vitals:    10/11/21 1439   BP:    Pulse:    Resp:    Temp:    SpO2: 92%       I & O :       Intake/Output Summary (Last 24 hours) at 10/11/2021 1720  Last data filed at 10/11/2021 1441  Gross per 24 hour   Intake 245 ml   Output 900 ml   Net -655 ml        Physical Examination :     General appearance: Anxious- no, distressed- no, in good spirits- no ,lethargy   HEENT: Lips- normal, teeth- ok , oral mucosa- moist  Neck : Mass- no, appears symmetrical, JVD- not visible  Respiratory: Respiratory effort-  tachypnic , wheeze- no, crackles - yes  Cardiovascular: Ausculation- No M/R/G, Edema no  Abdomen: visible mass- no, distention- no, scar- no, tenderness- no                            hepatosplenomegaly-  no  Musculoskeletal:  clubbing no,cyanosis- no , digital ischemia- no                           muscle strength- grossly normal , tone - grossly normal  Skin: rashes- no , ulcers- no, induration- no, tightening - no  Psychiatric:  Judgement and insight- normal           AAO X 3  Additional finding: -      LABS:     No results for input(s): WBC, HGB, HCT, PLT in the last 72 hours.   Recent Labs     10/09/21  0529 10/10/21  0500 10/11/21  0459    133* 135*   K 3.8 4.0 3.5   CL 99 96* 94*   CO2 27 30 29   BUN 34* 40* 50*   CREATININE <0.5* 0.6* 0.7*   GLUCOSE 123* 130* 132*   MG 1.70* 2.00 1.90   PHOS 1.7* 2.1* 4.3

## 2021-10-11 NOTE — PLAN OF CARE
Problem: Nutrition  Goal: Optimal nutrition therapy  Outcome: Ongoing  Note: Nutrition Problem #1: Inadequate oral intake  Intervention: Food and/or Nutrient Delivery: Continue Current Diet (Consider alternative nutrition)  Nutritional Goals: Pt will meet 50% of nutrition needs or greater this admission

## 2021-10-11 NOTE — PROGRESS NOTES
bilaterally without Rales/Wheezes/Rhonchi. Cardiovascular: Regular rate and rhythm with normal S1/S2 without murmurs, rubs or gallops. Abdomen: Soft, non-tender, non-distended with normal bowel sounds. Musculoskelatal: No clubbing, cyanosis. LUE swelling with focal erythema over AC. RUE mild swelling. Full range of motion without deformity. Neurologic:  Neurovascularly intact without any focal sensory/motor deficits. Cranial nerves: II-XII intact, grossly non-focal.  Psychiatric: Alert and oriented, thought content appropriate  Skin: Skin color, texture, turgor normal.  No rashes or lesions. Capillary Refill: Brisk,< 3 seconds   Peripheral Pulses: +2 palpable, equal bilaterally       Assessment/Plan:    Active Hospital Problems    Diagnosis     Hyponatremia [E87.1]      Acute hypoxic respiratory failure; etiology unclear, could be edema  CTA negative for PE  Noted pulmonary congestion vs lymphangitic spread  Echocardiogram showed normal LVEF  Ruled out COVID; rapid negative and PCR negative  Worsening. Wean O2 as tolerated. CXR with edema. Esophageal Mass, rule out Esophageal CA  Imaging raises suspicion for pulmonary edema vs lymphangitic spread, possible liver and splenic metastasis. He reports a long standing history of GERD symptoms but does not recall ever having an EGD. GI consulted; EGD 10/8, showing esophageal ulceration/mass. Biopsies pending   Oncology following   Full liquid diet    Acute hyponatremia:  Nephrology following  Improving  Monitor off IVF    Hypercalcemia: Suspected malignancy  PTH low  Zoledronic acid given  IVF and Lasix  Improved    Left common iliac: Severe narrowing evident on CT  Asymptomatic    Diabetes Mellitus, Type 2: Controlled. Hold home regimen. Monitor blood sugar and adjust regimen as needed. Diabetic (Carb-controlled) diet. Hypomagnesemia - replete. Repeat labs in am. Improved. Metabolic encephalopathy - supportive care.      DVT Prophylaxis: Lovenox  Diet: Adult Oral Nutrition Supplement; Diabetic Oral Supplement  ADULT DIET; Full Liquid  Code Status: Full Code  PT/OT Eval Status: NA    Dispo - Unclear. Await Oncology input on any additional testing needed while here. Otherwise, not sure he needs to wait until Monday/Tuesday for biopsy results.      Paloma Ferreira MD

## 2021-10-12 LAB
ALBUMIN SERPL-MCNC: 2.6 G/DL (ref 3.4–5)
ANION GAP SERPL CALCULATED.3IONS-SCNC: 11 MMOL/L (ref 3–16)
BUN BLDV-MCNC: 60 MG/DL (ref 7–20)
CALCIUM SERPL-MCNC: 9 MG/DL (ref 8.3–10.6)
CHLORIDE BLD-SCNC: 97 MMOL/L (ref 99–110)
CO2: 30 MMOL/L (ref 21–32)
CREAT SERPL-MCNC: 0.8 MG/DL (ref 0.8–1.3)
GFR AFRICAN AMERICAN: >60
GFR NON-AFRICAN AMERICAN: >60
GLUCOSE BLD-MCNC: 124 MG/DL (ref 70–99)
GLUCOSE BLD-MCNC: 132 MG/DL (ref 70–99)
GLUCOSE BLD-MCNC: 145 MG/DL (ref 70–99)
GLUCOSE BLD-MCNC: 148 MG/DL (ref 70–99)
MAGNESIUM: 1.9 MG/DL (ref 1.8–2.4)
PERFORMED ON: ABNORMAL
PHOSPHORUS: 4.5 MG/DL (ref 2.5–4.9)
POTASSIUM SERPL-SCNC: 3.9 MMOL/L (ref 3.5–5.1)
SODIUM BLD-SCNC: 138 MMOL/L (ref 136–145)

## 2021-10-12 PROCEDURE — 94761 N-INVAS EAR/PLS OXIMETRY MLT: CPT

## 2021-10-12 PROCEDURE — 6360000002 HC RX W HCPCS: Performed by: INTERNAL MEDICINE

## 2021-10-12 PROCEDURE — C9113 INJ PANTOPRAZOLE SODIUM, VIA: HCPCS | Performed by: INTERNAL MEDICINE

## 2021-10-12 PROCEDURE — 83735 ASSAY OF MAGNESIUM: CPT

## 2021-10-12 PROCEDURE — 1200000000 HC SEMI PRIVATE

## 2021-10-12 PROCEDURE — 36415 COLL VENOUS BLD VENIPUNCTURE: CPT

## 2021-10-12 PROCEDURE — 6360000002 HC RX W HCPCS: Performed by: NURSE PRACTITIONER

## 2021-10-12 PROCEDURE — 80069 RENAL FUNCTION PANEL: CPT

## 2021-10-12 PROCEDURE — 2700000000 HC OXYGEN THERAPY PER DAY

## 2021-10-12 PROCEDURE — 2580000003 HC RX 258: Performed by: INTERNAL MEDICINE

## 2021-10-12 RX ORDER — CODEINE PHOSPHATE AND GUAIFENESIN 10; 100 MG/5ML; MG/5ML
5 SOLUTION ORAL EVERY 4 HOURS PRN
Status: DISCONTINUED | OUTPATIENT
Start: 2021-10-12 | End: 2021-10-13 | Stop reason: HOSPADM

## 2021-10-12 RX ORDER — PANTOPRAZOLE SODIUM 40 MG/10ML
40 INJECTION, POWDER, LYOPHILIZED, FOR SOLUTION INTRAVENOUS
Status: DISCONTINUED | OUTPATIENT
Start: 2021-10-12 | End: 2021-10-13 | Stop reason: HOSPADM

## 2021-10-12 RX ADMIN — SODIUM CHLORIDE, PRESERVATIVE FREE 10 ML: 5 INJECTION INTRAVENOUS at 19:55

## 2021-10-12 RX ADMIN — PANTOPRAZOLE SODIUM 40 MG: 40 INJECTION, POWDER, FOR SOLUTION INTRAVENOUS at 18:11

## 2021-10-12 RX ADMIN — MORPHINE SULFATE 2 MG: 2 INJECTION, SOLUTION INTRAMUSCULAR; INTRAVENOUS at 18:11

## 2021-10-12 ASSESSMENT — PAIN SCALES - GENERAL
PAINLEVEL_OUTOF10: 7
PAINLEVEL_OUTOF10: 7

## 2021-10-12 NOTE — PROGRESS NOTES
Hospitalist Progress Note    Date of Admission: 10/3/2021    Chief Complaint: Fatigue    Hospital Course:                         78 y.o. male who presented to [facility] with past medical history of depression, hypertension, hyperlipidemia, osteoarthritis, Valerie Muster, type 2 diabetes, GERD presented to the ED with chief complaint of fatigue. Patient reported fatigue has been going on for over a month progressively worsening does have caregivers at home. Patient reports he lives alone does require assistance. Patient also reports that he has chronic vertigo and dizziness however he does take meclizine. Patient reports that he has been progressively getting worse and not feeling well and has been having constipation as well. With decreased appetite. Patient denied ever having an EGD done nor prior history of cancer. CODE STATUS discussed and the patient proceeded with full code. Subjective: still having SOB with significant productive cough. Biopsy confirms esophageal cancer. Labs:   No results for input(s): WBC, HGB, HCT, PLT in the last 72 hours. Recent Labs     10/10/21  0500 10/11/21  0459 10/12/21  0522   * 135* 138   K 4.0 3.5 3.9   CL 96* 94* 97*   CO2 30 29 30   BUN 40* 50* 60*   CREATININE 0.6* 0.7* 0.8   CALCIUM 9.2 9.2 9.0   PHOS 2.1* 4.3 4.5     No results for input(s): AST, ALT, BILIDIR, BILITOT, ALKPHOS in the last 72 hours. No results for input(s): INR in the last 72 hours. Physical Exam Performed:    /66   Pulse 109   Temp 97.6 °F (36.4 °C) (Oral)   Resp 18   Ht 5' 8\" (1.727 m)   Wt 170 lb 13.7 oz (77.5 kg)   SpO2 90%   BMI 25.98 kg/m²     General appearance: No apparent distress, appears stated age and cooperative. HEENT: Pupils equal, round, and reactive to light. Conjunctivae/corneas clear. Neck: Supple, no jugular venous distention. Trachea midline with full range of motion. Respiratory:  Normal respiratory effort.  Clear to auscultation, bilaterally without Rales/Wheezes/Rhonchi. Cardiovascular: Regular rate and rhythm with normal S1/S2 without murmurs, rubs or gallops. Abdomen: Soft, non-tender, non-distended with normal bowel sounds. Musculoskelatal: No clubbing, cyanosis. LUE swelling with focal erythema over AC. RUE mild swelling. Full range of motion without deformity. Neurologic:  Neurovascularly intact without any focal sensory/motor deficits. Cranial nerves: II-XII intact, grossly non-focal.  Psychiatric: Alert and oriented, thought content appropriate  Skin: Skin color, texture, turgor normal.  No rashes or lesions. Capillary Refill: Brisk,< 3 seconds   Peripheral Pulses: +2 palpable, equal bilaterally       Assessment/Plan:    Active Hospital Problems    Diagnosis     Hyponatremia [E87.1]      Esophageal Ca  - GI consulted; EGD 10/8, showing esophageal ulceration/mass. Biopsies confirming esophageal adenocarcinoma  - Imaging raises suspicion for pulmonary edema vs lymphangitic spread, possible liver and splenic metastasis.    - He reports a long standing history of GERD symptoms but does not recall ever having an EGD.  - Oncology following   - Full liquid diet  - hospice consulted  - continue pain control    Acute hypoxic respiratory failure  - CTA negative for PE  - Noted pulmonary congestion vs lymphangitic spread  - Echocardiogram showed normal LVEF  - Ruled out COVID; rapid negative and PCR negative  - little improvement with lasix  - likely related to esophageal cancer    Acute hyponatremia  - likely due to cancer  - Nephrology following  - Improving  - Monitor off IVF    Hypercalcemia  - Suspected malignancy  - PTH low  - Zoledronic acid given  - s/p IVF and Lasix  - Improved    Acute metabolic encephalopathy   - supportive care    Left common iliac stenosis  - Severe narrowing evident on CT  - Asymptomatic    Hypomagnesemia   - monitor and replete    DVT Prophylaxis: Lovenox  Diet: Adult Oral Nutrition Supplement; Diabetic Oral Supplement  ADULT DIET;  Full Liquid  Code Status: Full Code  PT/OT Eval Status: NA    Dispo - pending hospice discussions    Jacqueline Beasley MD

## 2021-10-12 NOTE — PROGRESS NOTES
Physical Therapy: Attempt. RN advises we hold today. Pt just had difficult conversation with MD regarding his cancer prognosis, he is in pain, SOB, tearful. Pt considering hospice consult.   Tracy Mehta PT

## 2021-10-12 NOTE — PROGRESS NOTES
ONCOLOGY HEMATOLOGY CARE PROGRESS NOTE      SUBJECTIVE:       He wants to know how \"bad it is\". ROS:     Constitutional:  No weight loss, No fever, No chills, No night sweats. Energy level good. Eyes:  No impairment or change in vision  ENT / Mouth:  No pain, abnormal ulceration, bleeding, nasal drip or change in voice or hearing  Cardiovascular:  No chest pain, palpitations, new edema, or calf discomfort  Respiratory:  No pain, hemoptysis, change to breathing  Breast:  No pain, discharge, change in appearance or texture  Gastrointestinal:  + unintentional weight loss and early sat  iety Urinary:  No pain, bleeding or change in continence  Genitalia: No pain, bleeding or discharge  Musculoskeletal:  No redness, pain, edema or weakness  Skin:  No pruritus, rash, change to nodules or lesions  Neurologic:  No discomfort, change in mental status, speech, sensory or motor activity  Psychiatric:  No change in concentration or change to affect or mood  Endocrine:  No hot flashes, increased thirst, or change to urine production  Hematologic: No petechiae, ecchymosis or bleeding  Lymphatic:  No lymphadenopathy or lymphedema  Allergy / Immunologic:  No eczema, hives, frequent or recurrent infections    OBJECTIVE        Physical    VITALS:  /66   Pulse 109   Temp 97.6 °F (36.4 °C) (Oral)   Resp 18   Ht 5' 8\" (1.727 m)   Wt 170 lb 13.7 oz (77.5 kg)   SpO2 90%   BMI 25.98 kg/m²   TEMPERATURE:  Current - Temp: 97.6 °F (36.4 °C);  Max - Temp  Av.6 °F (36.4 °C)  Min: 97.5 °F (36.4 °C)  Max: 97.7 °F (36.5 °C)  PULSE OXIMETRY RANGE: SpO2  Av.4 %  Min: 90 %  Max: 94 %  24HR INTAKE/OUTPUT:      Intake/Output Summary (Last 24 hours) at 10/12/2021 111  Last data filed at 10/12/2021 1996  Gross per 24 hour   Intake 50 ml   Output 750 ml   Net -700 ml       CONSTITUTIONAL: awake, alert, cooperative, no apparent distress ; appears chronically ill   EYES: pupils equal, round and reactive to light, sclera clear and conjunctiva normal  ENT: Normocephalic, without obvious abnormality, atraumatic  NECK: supple, symmetrical, no jugular venous distension and no carotid bruits   HEMATOLOGIC/LYMPHATIC: no cervical, supraclavicular or axillary lymphadenopathy   LUNGS: no increased work of breathing and clear to auscultation   CARDIOVASCULAR: regular rate and rhythm, normal S1 and S2, no murmur noted  ABDOMEN: normal bowel sounds x 4, soft, non-distended, non-tender, no masses palpated, no hepatosplenomgaly   : issa in place   MUSCULOSKELETAL: full range of motion noted, tone is normal  NEUROLOGIC: awake, alert, oriented to name, place and time. Motor skills grossly intact. SKIN: Normal skin color, texture, turgor and no jaundice. appears intact   EXTREMITIES: no LE edema       Data      No results for input(s): WBC, HGB, HCT, PLT, MCV in the last 72 hours. Recent Labs     10/10/21  0500 10/11/21  0459 10/12/21  0522   * 135* 138   K 4.0 3.5 3.9   CL 96* 94* 97*   CO2 30 29 30   PHOS 2.1* 4.3 4.5   BUN 40* 50* 60*   CREATININE 0.6* 0.7* 0.8     No results for input(s): AST, ALT, ALB, BILIDIR, BILITOT, ALKPHOS in the last 72 hours.     Magnesium:    Lab Results   Component Value Date    MG 1.90 10/12/2021    MG 1.90 10/11/2021    MG 2.00 10/10/2021         Problem List  Patient Active Problem List   Diagnosis    Degeneration of cervical intervertebral disc    Degeneration of lumbar or lumbosacral intervertebral disc    Lumbar radiculopathy    Cervical radiculopathy, chronic    Brain TIA    Type 2 diabetes mellitus without complication, without long-term current use of insulin (HCC)    Mixed hyperlipidemia    Essential hypertension    Gastroesophageal reflux disease without esophagitis    Cerebrovascular accident (CVA) ([address]    Iron deficiency anemia secondary to inadequate dietary iron intake    Colonoscopy refused    Hyponatremia   Technical processing at [facility], [address], [address] [facility]  [phone]     FINAL DIAGNOSIS:        A. Stomach, biopsy:        - Predominantly body-type gastric mucosa with proton pump inhibitor          effect.        - No evidence of Helicobacter pylori identified on H&E-stained          sections.        B. Esophagus, biopsy:        - Invasive poorly-differentiated adenocarcinoma with signet ring          cell features and surface ulceration.        C. Esophagus, biopsy:        - Invasive poorly-differentiated adenocarcinoma with signet ring          cell features underlying reactive squamous mucosa.        - No columnar mucosa present for evaluation. ASSESSMENT AND PLAN:      Esophageal  cancer with liver met  - Await EGD for path - EGD tomorrow 10-8 - path is back with adeno CA (see above)   - Outpatient PET and PORT - patient declines   - Discussed possible chemo/rads with patient. He does not desire this approach. ONCOLOGIC DISPOSITION: bx confirms adenoCA- CT scans with possible mets to liver, spleen and lymphangitic spread. Patient does not want PET scan for clarification; he is greatly disabled from his co-morbidities with limited home support. He is asking for hospice care and to focus on quality. He understand that hospice means no chemo/radiation. He states that hospice means, \"the party is over, go sit down\".      Hospice consult placed     Rosa Daniel CNP   Please contact through [address]

## 2021-10-12 NOTE — PROGRESS NOTES
Pt assessment completed and charted. VSS. Pt a/ox4 w/ confusion. Pt very anxious. Pt incot of stool. Pt has f/c in place and patent. Fernandez care preformed. Pt yells and talks in sleep. Pt on 5L nc o2. Pt has crackles in L base Pt denies any other needs at this time. Pt is a fall risk;  -Bed in lowest position and wheels locked.    -Call light within reach.   -Bedside table within reach.   -Non-skid footwear in place.  -bed check in place

## 2021-10-12 NOTE — PROGRESS NOTES
Seen the bedside  Spoke to nurse and patient  Plan for hospice  We will sign off  Please call with questions

## 2021-10-12 NOTE — CARE COORDINATION
Hospital day 8: Patient on C3 re Hyponatremia followed by IM, GI, Oncology, and Nephrology. Patient with SNF recs and is in agreement that is not strong enough to return home at this time, referrals pending to [facility]. Following for Oncology recs for treatment as maybe a barrier to SNF placement. Patient SW following for [facility] needs. Katiuska Reyna 122: Hospice consult received Patient request to call Pawan Pan, update on plan of care. Per [facility] Pan looking to take home with hospice. Referral made to Mert Byrd to assist in eval support that can offer within the home. Family edu that hospice will no support around the clock care and may look more like weekly visits pending Patient status. Verbalized understanding will await consult meeting to determine plans. LINWOOD Reyna

## 2021-10-12 NOTE — PROGRESS NOTES
Progress Note  Date:10/12/2021       [facility]:[id number]  Patient Lynsey Jacinto     YOB: 1945     Age:76 y.o. Subjective    Subjective:  Symptoms:  Stable. Pain:  He reports no pain. Review of Systems  Objective         Vitals Last 24 Hours:  TEMPERATURE:  Temp  Av.7 °F (36.5 °C)  Min: 97.5 °F (36.4 °C)  Max: 97.9 °F (36.6 °C)  RESPIRATIONS RANGE: Resp  Av.2  Min: 17  Max: 20  PULSE OXIMETRY RANGE: SpO2  Av.3 %  Min: 90 %  Max: 94 %  PULSE RANGE: Pulse  Av.2  Min: 99  Max: 115  BLOOD PRESSURE RANGE: Systolic (30LNS), LBW:656 , Min:103 , NNJ:235   ; Diastolic (70BGP), WSM:61, Min:61, Max:78    I/O (24Hr): Intake/Output Summary (Last 24 hours) at 10/12/2021 0746  Last data filed at 10/12/2021 8175  Gross per 24 hour   Intake 50 ml   Output 750 ml   Net -700 ml     Objective:  General Appearance:  Not in pain. Vital signs: (most recent): Blood pressure 103/66, pulse 109, temperature 97.6 °F (36.4 °C), temperature source Oral, resp. rate 18, height 5' 8\" (1.727 m), weight 170 lb 13.7 oz (77.5 kg), SpO2 90 %. Abdomen: Abdomen is soft. Bowel sounds are normal.   There is no abdominal tenderness. Labs/Imaging/Diagnostics    Labs:  CBC:No results for input(s): WBC, RBC, HGB, HCT, MCV, RDW, PLT in the last 72 hours. CHEMISTRIES:  Recent Labs     10/10/21  0500 10/11/21  0459 10/12/21  0522   * 135* 138   K 4.0 3.5 3.9   CL 96* 94* 97*   CO2 30 29 30   BUN 40* 50* 60*   CREATININE 0.6* 0.7* 0.8   GLUCOSE 130* 132* 132*   PHOS 2.1* 4.3 4.5   MG 2.00 1.90 1.90     PT/INR:No results for input(s): PROTIME, INR in the last 72 hours. APTT:No results for input(s): APTT in the last 72 hours. LIVER PROFILE:No results for input(s): AST, ALT, BILIDIR, BILITOT, ALKPHOS in the last 72 hours.     Imaging Last 24 Hours:  XR CHEST (2 VW)    Result Date: 10/10/2021  EXAMINATION: TWO XRAY VIEWS OF THE CHEST 10/10/2021 12:34 pm COMPARISON: 10/07/2021 HISTORY: ORDERING SYSTEM PROVIDED HISTORY: hypoxia TECHNOLOGIST PROVIDED HISTORY: Reason for exam:->hypoxia FINDINGS: The cardiac silhouette is normal.  There is moderate thoracic aortic calcification. Hilar contours are stable. There is symmetrical interstitial-alveolar opacities in the lungs. Alveolar components in the medial aspects of the lung bases are relatively greater and also symmetrical.  Fissural thickening is noted. Costophrenic angles are blunted. Pleural effusion extends posteriorly above the level of the miguel ángel. No acute osseous abnormality is seen. Small bilateral pleural effusions and stable interstitial-alveolar opacities, consistent with pulmonary edema. Alveolar proteinosis may also have this appearance. Assessment//Plan           Hospital Problems         Last Modified POA    Hyponatremia 10/4/2021 Yes        Assessment & Plan  79 yo w depression, hypertension, hyperlipidemia, osteoarthritis, Rodriguez, type 2 diabetes, GERD w dysphagia and a malignant-appearing esoph mass on EGD.  Bx confirmed AdenoCA.     - Further management per Oncology  - Will follow     Krystin Sumner MD       (O) [phone]  Electronically signed by Krystin Sumner MD on 10/12/21 at 7:46 AM EDT

## 2021-10-13 VITALS
TEMPERATURE: 97.5 F | RESPIRATION RATE: 20 BRPM | SYSTOLIC BLOOD PRESSURE: 109 MMHG | OXYGEN SATURATION: 90 % | HEART RATE: 112 BPM | DIASTOLIC BLOOD PRESSURE: 66 MMHG | BODY MASS INDEX: 25.16 KG/M2 | HEIGHT: 68 IN | WEIGHT: 166.01 LBS

## 2021-10-13 LAB
ALBUMIN SERPL-MCNC: 2.8 G/DL (ref 3.4–5)
ANION GAP SERPL CALCULATED.3IONS-SCNC: 14 MMOL/L (ref 3–16)
BUN BLDV-MCNC: 69 MG/DL (ref 7–20)
CALCIUM SERPL-MCNC: 9 MG/DL (ref 8.3–10.6)
CHLORIDE BLD-SCNC: 96 MMOL/L (ref 99–110)
CO2: 29 MMOL/L (ref 21–32)
CREAT SERPL-MCNC: 0.9 MG/DL (ref 0.8–1.3)
GFR AFRICAN AMERICAN: >60
GFR NON-AFRICAN AMERICAN: >60
GLUCOSE BLD-MCNC: 132 MG/DL (ref 70–99)
GLUCOSE BLD-MCNC: 137 MG/DL (ref 70–99)
GLUCOSE BLD-MCNC: 141 MG/DL (ref 70–99)
GLUCOSE BLD-MCNC: 142 MG/DL (ref 70–99)
GLUCOSE BLD-MCNC: 154 MG/DL (ref 70–99)
MAGNESIUM: 1.9 MG/DL (ref 1.8–2.4)
PERFORMED ON: ABNORMAL
PHOSPHORUS: 3.7 MG/DL (ref 2.5–4.9)
POTASSIUM SERPL-SCNC: 4.2 MMOL/L (ref 3.5–5.1)
SODIUM BLD-SCNC: 139 MMOL/L (ref 136–145)

## 2021-10-13 PROCEDURE — 6360000002 HC RX W HCPCS: Performed by: INTERNAL MEDICINE

## 2021-10-13 PROCEDURE — 80069 RENAL FUNCTION PANEL: CPT

## 2021-10-13 PROCEDURE — C9113 INJ PANTOPRAZOLE SODIUM, VIA: HCPCS | Performed by: INTERNAL MEDICINE

## 2021-10-13 PROCEDURE — 36415 COLL VENOUS BLD VENIPUNCTURE: CPT

## 2021-10-13 PROCEDURE — 6360000002 HC RX W HCPCS: Performed by: NURSE PRACTITIONER

## 2021-10-13 PROCEDURE — 2700000000 HC OXYGEN THERAPY PER DAY

## 2021-10-13 PROCEDURE — 94761 N-INVAS EAR/PLS OXIMETRY MLT: CPT

## 2021-10-13 PROCEDURE — 83735 ASSAY OF MAGNESIUM: CPT

## 2021-10-13 RX ORDER — LORAZEPAM 2 MG/ML
1 INJECTION INTRAMUSCULAR EVERY 4 HOURS PRN
Qty: 9 ML | Refills: 0
Start: 2021-10-13 | End: 2021-10-16

## 2021-10-13 RX ORDER — LORAZEPAM 2 MG/ML
1 INJECTION INTRAMUSCULAR EVERY 4 HOURS PRN
Status: DISCONTINUED | OUTPATIENT
Start: 2021-10-13 | End: 2021-10-13 | Stop reason: HOSPADM

## 2021-10-13 RX ORDER — CODEINE PHOSPHATE AND GUAIFENESIN 10; 100 MG/5ML; MG/5ML
5 SOLUTION ORAL EVERY 4 HOURS PRN
Qty: 118 ML | Refills: 0
Start: 2021-10-13 | End: 2021-10-17

## 2021-10-13 RX ORDER — MORPHINE SULFATE 2 MG/ML
2 INJECTION, SOLUTION INTRAMUSCULAR; INTRAVENOUS EVERY 4 HOURS PRN
Qty: 18 ML | Refills: 0
Start: 2021-10-13 | End: 2021-10-16

## 2021-10-13 RX ADMIN — ONDANSETRON 4 MG: 2 INJECTION INTRAMUSCULAR; INTRAVENOUS at 21:47

## 2021-10-13 RX ADMIN — LORAZEPAM 1 MG: 2 INJECTION INTRAMUSCULAR; INTRAVENOUS at 19:11

## 2021-10-13 RX ADMIN — LORAZEPAM 1 MG: 2 INJECTION INTRAMUSCULAR; INTRAVENOUS at 08:48

## 2021-10-13 RX ADMIN — PANTOPRAZOLE SODIUM 40 MG: 40 INJECTION, POWDER, FOR SOLUTION INTRAVENOUS at 05:03

## 2021-10-13 RX ADMIN — MORPHINE SULFATE 2 MG: 2 INJECTION, SOLUTION INTRAMUSCULAR; INTRAVENOUS at 21:47

## 2021-10-13 ASSESSMENT — PAIN SCALES - GENERAL: PAINLEVEL_OUTOF10: 8

## 2021-10-13 NOTE — PROGRESS NOTES
Pt assessment completed and charted. VSS. Pt alert but disoriented x3. O2 sat remaining 90% on 6L. High flow nasal cannula in room just in case. Pt slightly anxious. HR will rise to 130s. Hospice consult w/ family tomorrow. F/C draining cloudy urine. Bed alarm on. Bed in lowest position and wheels locked. Call light within reach. Bedside table within reach. Non-skid footwear in place. Pt denies any other needs at this time. Pt calls out appropriately. Will continue to monitor.

## 2021-10-13 NOTE — DISCHARGE SUMMARY
Hospital Medicine Discharge Summary    Patient ID: Gloria Reddy      Patient's PCP: SUDHIR Horne - CNP    Admit Date: 10/3/2021     Discharge Date:   10/13/21    Admitting Physician: Ho Dey DO     Discharge Physician: Mckinley Velasquez MD     Discharge Diagnoses: Active Hospital Problems    Diagnosis     Hyponatremia [E87.1]        The patient was seen and examined on day of discharge and this discharge summary is in conjunction with any daily progress note from day of discharge. Hospital Course:   78 y.o. male who presented to [facility] with past medical history of depression, hypertension, hyperlipidemia, osteoarthritis, Terrea Phil, type 2 diabetes, GERD presented to the ED with chief complaint of fatigue.     Patient reported fatigue has been going on for over a month progressively worsening does have caregivers at home. Patient reports he lives alone does require assistance. Patient also reports that he has chronic vertigo and dizziness however he does take meclizine. Patient reports that he has been progressively getting worse and not feeling well and has been having constipation as well. With decreased appetite. Patient denied ever having an EGD done nor prior history of cancer. Esophageal Ca  - GI consulted; EGD 10/8, showing esophageal ulceration/mass. Biopsies confirming esophageal adenocarcinoma  - Imaging raised suspicion for pulmonary edema vs lymphangitic spread, possible liver and splenic metastasis.    - He reports a long standing history of GERD symptoms but does not recall ever having an EGD.  - Oncology following   - Full liquid diet  - continue pain control  - hospice consulted     Acute hypoxic respiratory failure  - CTA negative for PE  - Noted pulmonary congestion vs lymphangitic spread  - Echocardiogram showed normal LVEF  - Ruled out COVID; rapid negative and PCR negative  - little improvement with lasix  - likely related to esophageal cancer     Acute hyponatremia  - likely due to cancer  - Nephrology following  - Improving     Hypercalcemia  - Suspected malignancy  - PTH low  - Zoledronic acid given  - s/p IVF and Lasix  - Improved     Acute metabolic encephalopathy   - supportive care     Left common iliac stenosis  - Severe narrowing evident on CT  - Asymptomatic     Hypomagnesemia   - repleted    Physical Exam Performed:     /64   Pulse 107   Temp 98 °F (36.7 °C) (Axillary)   Resp 17   Ht 5' 8\" (1.727 m)   Wt 166 lb 0.1 oz (75.3 kg)   SpO2 93%   BMI 25.24 kg/m²       General appearance: No apparent distress, appears stated age and cooperative. HEENT: Pupils equal, round, and reactive to light. Conjunctivae/corneas clear. Neck: Supple, no jugular venous distention. Trachea midline with full range of motion. Respiratory:  Normal respiratory effort. Clear to auscultation, bilaterally without Rales/Wheezes/Rhonchi. Cardiovascular: Regular rate and rhythm with normal S1/S2 without murmurs, rubs or gallops. Abdomen: Soft, non-tender, non-distended with normal bowel sounds. Musculoskelatal: No clubbing, cyanosis. LUE swelling with focal erythema over AC. RUE mild swelling. Full range of motion without deformity. Neurologic:  Neurovascularly intact without any focal sensory/motor deficits. Cranial nerves: II-XII intact, grossly non-focal.  Psychiatric: Alert and oriented, thought content appropriate  Skin: Skin color, texture, turgor normal.  No rashes or lesions. Capillary Refill: Brisk,< 3 seconds   Peripheral Pulses: +2 palpable, equal bilaterally     Labs:  For convenience and continuity at follow-up the following most recent labs are provided:      CBC:    Lab Results   Component Value Date    WBC 5.6 10/05/2021    HGB 13.7 10/05/2021    HCT 39.0 10/05/2021     10/05/2021       Renal:    Lab Results   Component Value Date     10/13/2021    K 4.2 10/13/2021    K 3.6 10/06/2021    CL 96 10/13/2021    CO2 29 10/13/2021    BUN 69 hypoattenuating lesions within the liver and mild upper   abdominal lymphadenopathy. Metastatic disease not excluded. Small focal hypodensity along the inferior pole of the spleen could also   reflect a metastatic implant or small infarct. Diverticulosis coli. Atherosclerotic disease resulting in severe narrowing of the left common   iliac artery. XR CHEST PORTABLE   Final Result   Mild left ventricular enlargement with mild central pulmonary congestion or   pulmonary artery hypertension      Hazy interstitial ground-glass opacities throughout both lungs which is most   prominent in the lung bases and could be due to pulmonary edema and/or   multisegmental bronchopneumonia. Consults:     IP CONSULT TO HOSPITALIST  IP CONSULT TO GI  IP CONSULT TO HEM/ONC  IP CONSULT TO NEPHROLOGY  IP CONSULT TO HOSPICE    Disposition:  Hospice     Condition at Discharge: Terminal    Discharge Instructions/Follow-up:  N/A    Code Status:  DNR    Activity: activity as tolerated    Diet: regular diet      Discharge Medications:     Current Discharge Medication List           Details   Morphine Sulfate (MORPHINE, PF,) 2 MG/ML SOLN injection Infuse 1 mL intravenously every 4 hours as needed (pain) for up to 3 days. Qty: 18 mL, Refills: 0    Comments: Reduce doses taken as pain becomes manageable  Associated Diagnoses: Malignant neoplasm of lower third of esophagus (HCC)      LORazepam (ATIVAN) 2 MG/ML injection Infuse 0.5 mLs intravenously every 4 hours as needed (anxiety, agitation) for up to 3 days. Qty: 9 mL, Refills: 0    Associated Diagnoses: Malignant neoplasm of lower third of esophagus (HCC)      guaiFENesin-codeine (GUAIFENESIN AC) 100-10 MG/5ML liquid Take 5 mLs by mouth every 4 hours as needed for Cough for up to 4 days.   Qty: 118 mL, Refills: 0    Comments: Reduce doses taken as pain becomes manageable  Associated Diagnoses: Malignant neoplasm of lower third of esophagus ([address] Details   docusate sodium (COLACE) 100 MG capsule       meclizine (ANTIVERT) 25 MG CHEW              Time Spent on discharge is more than 30 minutes in the examination, evaluation, counseling and review of medications and discharge plan. Signed:    Chepe Jones MD   10/13/2021      Thank you SUDHIR Rodríguez CNP for the opportunity to be involved in this patient's care. If you have any questions or concerns please feel free to contact me at [phone].

## 2021-10-13 NOTE — CARE COORDINATION
Plans to meet with [facility] today at 0900. LINWOOD Grewal  [id number]: Writer placed call to [facility] re end result  Of meeting. Writer informed family running late but are still planning to meet. Writer edu [facility] need and IV pain medication for comfort, per hospice trying for inpatient unit to manage symptoms will send some on to hospital once consents are signed. LINWOOD Grewal  3716: Spoke with hospice nurse is in route to complete admission assessment, will notify MD once have end result . LINWOOD Grewal   Case Management Discharge Summary- Hospice Discharge    Destination:   [facility] [address] name and contact: [phone]    Durable Equipment arrangements are completed by the Hospice nurse. [facility] DNR signed and placed in discharge packet AND patient's hard chart. (This is required for DNR patients.)    Signed [facility]/AVS faxed to above agency/facility. [phone], [phone]    Transportation arrangements per Hospice RN. Transport agency name and  time: [facility]     Transport form completed and signed by:  Kaylene MYERS     Notified Family  Contact name: Seth Caryl [phone]    Patient's RN notified of plan: [facility], RN     Note:    Discharging nurse to complete nursing portion of [facility]OC, reconcile AVS, place final copy with patient's discharge packet. RN to ensure signed prescriptions are sent home with patient or the facility as per nursing protocol.   LINWOOD Grewal

## 2021-10-13 NOTE — DISCHARGE INSTR - COC
Continuity of Care Form    Patient Name: Diana Patel   :  1945  MRN:  [id number]    Admit date:  10/3/2021  Discharge date:  10/13/2021    Code Status Order: Full Code   Advance Directives:      Admitting Physician:  Cindy Yepez DO  PCP: SUDHIR Milan CNP    Discharging Nurse: James Spear [facility]/Room#: 9364/4318-05  Discharging Unit Phone Number: [phone]    Emergency Contact:   Extended Emergency Contact Information  Primary Emergency Contact: Lashanda Joseph, [address] [place] Phone: [phone]  Mobile Phone: [phone]  Relation: Other    Past Surgical History:  Past Surgical History:   Procedure Laterality Date    HERNIA REPAIR      LAPAROTOMY  2018    with dr Tiny Reyna 18    UPPER GASTROINTESTINAL ENDOSCOPY N/A 10/8/2021    EGD BIOPSY performed by Luciano Lindsay MD at [address] [place] [address]       Immunization History:   Immunization History   Administered Date(s) Administered    Pneumococcal Conjugate 13-valent Metta Necessary) 03/15/2018       Active Problems:  Patient Active Problem List   Diagnosis Code    Degeneration of cervical intervertebral disc M50.30    Degeneration of lumbar or lumbosacral intervertebral disc M51.37    Lumbar radiculopathy M54.16    Cervical radiculopathy, chronic M54.12    Brain TIA G45.9    Type 2 diabetes mellitus without complication, without long-term current use of insulin (HCC) E11.9    Mixed hyperlipidemia E78.2    Essential hypertension I10    Gastroesophageal reflux disease without esophagitis K21.9    Cerebrovascular accident (CVA) ([facility] [address] I63.9    Iron deficiency anemia secondary to inadequate dietary iron intake D50.8    Colonoscopy refused Z53.20    Hyponatremia E87.1       Isolation/Infection:   Isolation            No Isolation          Patient Infection Status       Infection Onset Added Last Indicated Last Indicated By Review Planned Expiration Resolved Resolved By    None active    Resolved    COVID-19 Rule Out 10/04/21 10/04/21 10/04/21 COVID-19 (Ordered)   10/04/21 Rule-Out Test Resulted    COVID-19 Rule Out 10/03/21 10/03/21 10/03/21 COVID-19, Rapid (Ordered)   10/03/21 Rule-Out Test Resulted            Nurse Assessment:  Last Vital Signs: /66   Pulse 105   Temp 97.6 °F (36.4 °C) (Axillary)   Resp 18   Ht 5' 8\" (1.727 m)   Wt 166 lb 0.1 oz (75.3 kg)   SpO2 91%   BMI 25.24 kg/m²     Last documented pain score (0-10 scale): Pain Level: 7  Last Weight:   Wt Readings from Last 1 Encounters:   10/13/21 166 lb 0.1 oz (75.3 kg)     Mental Status:  alert and confused at times    IV Access:  - Peripheral IV - site  R hand, insertion date: 10/5/2021    Nursing Mobility/ADLs:  Walking   Dependent  Transfer  Dependent  Bathing  Dependent  Dressing  Dependent  Toileting  Dependent  Feeding  Assisted  Med Admin  Assisted  Med Delivery   whole    Wound Care Documentation and Therapy:  Incision 06/14/18 Abdomen Medial;Mid;Lower;Right;Left (Active)   Number of days: 1217        Elimination:  Continence:   · Bowel: No  · Bladder: issa  Urinary Catheter: Insertion Date: 10/6/2021 and Indication for Use of Catheter: Hospice/comfort/palliateive care   Colostomy/Ileostomy/Ileal Conduit: No       Date of Last BM: 10/12/2021    Intake/Output Summary (Last 24 hours) at 10/13/2021 1403  Last data filed at 10/13/2021 0502  Gross per 24 hour   Intake    Output 400 ml   Net -400 ml     I/O last 3 completed shifts: In: 240 [P.O.:240]  Out: [address] [place] [address]    Safety Concerns:      At Risk for Falls and Aspiration Risk    Impairments/Disabilities:      None    Nutrition Therapy:  Current Nutrition Therapy:   - Oral Diet:  Full Liquid    Routes of Feeding: Oral  Liquids: No Restrictions  Daily Fluid Restriction: no  Last Modified Barium Swallow with Video (Video Swallowing Test): not done    Treatments at the Time of Hospital Discharge:   Respiratory Treatments: oxygen for comfort  Oxygen Therapy:  is on oxygen at 6 L/min per nasal cannula. Ventilator:    - No ventilator support    Rehab Therapies: none  Weight Bearing Status/Restrictions: No weight bearing restirctions  Other Medical Equipment (for information only, NOT a DME order):  hospital bed  Other Treatments: hospice    Patient's personal belongings (please select all that are sent with patient):  Glasses    RN SIGNATURE:  Electronically signed by Mckay Cifuentes RN on 10/13/21 at 8:59 PM EDT    CASE MANAGEMENT/SOCIAL WORK SECTION    Inpatient Status Date:10/04/2021    Readmission Risk Assessment Score:  Readmission Risk              Risk of Unplanned Readmission:  18         Discharging to Facility/ [address]   [phone]     / signature: Electronically signed by LINWOOD Cary on 10/13/21 at 2:04 PM EDT    PHYSICIAN SECTION    Prognosis: Poor    Condition at Discharge: Terminal    Rehab Potential (if transferring to Rehab): Poor    Recommended Labs or Other Treatments After Discharge:     Physician Certification: I certify the above information and transfer of Gin Plata  is necessary for the continuing treatment of the diagnosis listed and that he requires Hospice for less 30 days.      Update Admission H&P: No change in H&P    PHYSICIAN SIGNATURE:  Electronically signed by Nguyễn Gonzalez MD on 10/13/21 at 2:38 PM EDT

## 2021-10-14 NOTE — PROGRESS NOTES
Pt daughter Josie Mcknight called requesting for updates on the patient, stating that his aid keeps moving him around and attempting to hide him from them. Noted that her name was crossed off of the emergency contact list. Writer asked pt if he would be okay with me giving her updates, the patient said \"hell no, don't tell that devil women anything\" pt also started becoming visible upset and crying he kept repeating \" I don't want her to know anything\". Called Josie Mcknight back to notify that the patient did not want her receiving any information.

## 2021-10-14 NOTE — PROGRESS NOTES
Pt very anxious this morning, page sent to MD for notify and for orders. VSS. Still on 7L. Bed check in place. Will continue to monitor.

## 2021-10-14 NOTE — PROGRESS NOTES
Progress Note  Date:10/13/2021       [id number]  Kath Roa     YOB: 1945     Age:76 y.o. Subjective    Subjective:  Symptoms:  Stable. Pain:  He reports no pain. Review of Systems  Objective         Vitals Last 24 Hours:  TEMPERATURE:  Temp  Av.6 °F (36.4 °C)  Min: 97.5 °F (36.4 °C)  Max: 98 °F (36.7 °C)  RESPIRATIONS RANGE: Resp  Av.3  Min: 16  Max: 20  PULSE OXIMETRY RANGE: SpO2  Av.3 %  Min: 90 %  Max: 97 %  PULSE RANGE: Pulse  Av.8  Min: 102  Max: 112  BLOOD PRESSURE RANGE: Systolic (02GIQ), KUL:353 , Min:104 , ZUN:159   ; Diastolic (29OGY), TDI:38, Min:64, Max:77    I/O (24Hr): Intake/Output Summary (Last 24 hours) at 10/13/2021 2227  Last data filed at 10/13/2021 2127  Gross per 24 hour   Intake    Output 500 ml   Net -500 ml     Objective:  General Appearance:  Not in pain and in no acute distress. Vital signs: (most recent): Blood pressure 109/66, pulse 112, temperature 97.5 °F (36.4 °C), temperature source Oral, resp. rate 20, height 5' 8\" (1.727 m), weight 166 lb 0.1 oz (75.3 kg), SpO2 90 %. Abdomen: Abdomen is soft. Bowel sounds are normal.   There is no abdominal tenderness. Labs/Imaging/Diagnostics    Labs:  CBC:No results for input(s): WBC, RBC, HGB, HCT, MCV, RDW, PLT in the last 72 hours. CHEMISTRIES:  Recent Labs     10/11/21  0459 10/12/21  0522 10/13/21  0601   * 138 139   K 3.5 3.9 4.2   CL 94* 97* 96*   CO2 29 30 29   BUN 50* 60* 69*   CREATININE 0.7* 0.8 0.9   GLUCOSE 132* 132* 154*   PHOS 4.3 4.5 3.7   MG 1.90 1.90 1.90     PT/INR:No results for input(s): PROTIME, INR in the last 72 hours. APTT:No results for input(s): APTT in the last 72 hours. LIVER PROFILE:No results for input(s): AST, ALT, BILIDIR, BILITOT, ALKPHOS in the last 72 hours. Imaging Last 24 Hours:  No results found.   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  Bx confirmed AdenoCA.     - Further management per Oncology/Hospice  - Will follow     MD Bruce Crespo) [phone]  Electronically signed by Lizzy Yeager MD on 10/13/21 at 10:27 PM EDT

## (undated) DEVICE — FORCEP BX STD CAP 240CM RAD JAW 4

## (undated) DEVICE — CONMED SCOPE SAVER BITE BLOCK, 20X27 MM: Brand: SCOPE SAVER

## (undated) DEVICE — ENDO CARRY-ON PROCEDURE KIT INCLUDES SUCTION TUBING, LUBRICANT, GAUZE, BIOHAZARD STICKER, TRANSPORT PAD AND INTERCEPT BEDSIDE KIT.: Brand: ENDO CARRY-ON PROCEDURE KIT

## (undated) DEVICE — FORMALIN  10%NBF 20ML PREFLL CONT